# Patient Record
Sex: FEMALE | Race: WHITE | NOT HISPANIC OR LATINO | Employment: PART TIME | URBAN - NONMETROPOLITAN AREA
[De-identification: names, ages, dates, MRNs, and addresses within clinical notes are randomized per-mention and may not be internally consistent; named-entity substitution may affect disease eponyms.]

---

## 2018-03-09 ENCOUNTER — OFFICE VISIT CONVERTED (OUTPATIENT)
Dept: FAMILY MEDICINE CLINIC | Age: 53
End: 2018-03-09
Attending: NURSE PRACTITIONER

## 2018-03-10 LAB
ALBUMIN SERPL-MCNC: 4.5 G/DL
ALBUMIN/GLOB SERPL: 1.9 {RATIO}
ALP SERPL-CCNC: 69 IU/L
ALT SERPL-CCNC: 27 IU/L
AST SERPL-CCNC: 19 IU/L
BASOPHILS # BLD AUTO: 0.1 X10E3/UL
BASOPHILS NFR BLD AUTO: 1 %
BILIRUB SERPL-MCNC: 0.4 MG/DL
BUN SERPL-MCNC: 17 MG/DL
BUN/CREAT SERPL: 18
CALCIUM SERPL-MCNC: 9.8 MG/DL
CHLORIDE SERPL-SCNC: 100 MMOL/L
CHOLEST SERPL-MCNC: 160 MG/DL
CO2 SERPL-SCNC: 27 MMOL/L
CONV IMMATURE GRAN: 0 %
CONV TOTAL PROTEIN: 6.9 G/DL
CREAT UR-MCNC: 0.95 MG/DL
EOSINOPHIL # BLD AUTO: 0.2 X10E3/UL
EOSINOPHIL # BLD AUTO: 4 %
ERYTHROCYTE [DISTWIDTH] IN BLOOD BY AUTOMATED COUNT: 13.6 %
GLOBULIN UR ELPH-MCNC: 2.4 G/DL
GLUCOSE SERPL-MCNC: 103 MG/DL
HCT VFR BLD AUTO: 40.7 %
HCV AB SER DONR QL: <0.1 S/CO RATIO
HDLC SERPL-MCNC: 47 MG/DL
HGB BLD-MCNC: 13.7 G/DL
IMM GRANULOCYTES # BLD: 0 X10E3/UL
LDLC SERPL CALC-MCNC: 95 MG/DL
LDLC/HDLC SERPL: 2 RATIO UNITS
LYMPHOCYTES # BLD AUTO: 1.6 X10E3/UL
LYMPHOCYTES NFR BLD AUTO: 31 %
MCH RBC QN AUTO: 28.9 PG
MCHC RBC AUTO-ENTMCNC: 33.7 G/DL
MCV RBC AUTO: 86 FL
MONOCYTES # BLD AUTO: 0.4 X10E3/UL
MONOCYTES NFR BLD AUTO: 7 %
NEUTROPHILS # BLD AUTO: 2.9 X10E3/UL
NEUTROPHILS NFR BLD AUTO: 57 %
PLATELET # BLD AUTO: 280 X10E3/UL
POTASSIUM SERPL-SCNC: 4.7 MMOL/L
RBC # BLD AUTO: 4.74 X10E6/UL
SODIUM SERPL-SCNC: 142 MMOL/L
TRIGL SERPL-MCNC: 89 MG/DL
TSH SERPL-ACNC: 1.93 UIU/ML
VLDLC SERPL-MCNC: 18 MG/DL
WBC # BLD AUTO: 5.1 X10E3/UL

## 2018-06-13 ENCOUNTER — OFFICE VISIT CONVERTED (OUTPATIENT)
Dept: FAMILY MEDICINE CLINIC | Age: 53
End: 2018-06-13
Attending: NURSE PRACTITIONER

## 2019-03-04 ENCOUNTER — OFFICE VISIT CONVERTED (OUTPATIENT)
Dept: FAMILY MEDICINE CLINIC | Age: 54
End: 2019-03-04
Attending: NURSE PRACTITIONER

## 2019-03-09 LAB
ALBUMIN SERPL-MCNC: 4.6 G/DL
ALBUMIN/GLOB SERPL: 1.8 {RATIO}
ALP SERPL-CCNC: 78 IU/L
ALT SERPL-CCNC: 17 IU/L
AST SERPL-CCNC: 16 IU/L
BASOPHILS # BLD AUTO: 0 X10E3/UL
BASOPHILS NFR BLD AUTO: 1 %
BILIRUB SERPL-MCNC: 0.4 MG/DL
BUN SERPL-MCNC: 13 MG/DL
BUN/CREAT SERPL: 12
CALCIUM SERPL-MCNC: 9.8 MG/DL
CHLORIDE SERPL-SCNC: 101 MMOL/L
CHOLEST SERPL-MCNC: 157 MG/DL
CO2 SERPL-SCNC: 27 MMOL/L
CONV IMMATURE GRAN: 0 %
CONV TOTAL PROTEIN: 7.2 G/DL
CREAT UR-MCNC: 1.08 MG/DL
EOSINOPHIL # BLD AUTO: 0.3 X10E3/UL
EOSINOPHIL # BLD AUTO: 5 %
ERYTHROCYTE [DISTWIDTH] IN BLOOD BY AUTOMATED COUNT: 14.1 %
GLOBULIN UR ELPH-MCNC: 2.6 G/DL
GLUCOSE SERPL-MCNC: 110 MG/DL
HCT VFR BLD AUTO: 41.5 %
HDLC SERPL-MCNC: 47 MG/DL
HGB BLD-MCNC: 13.5 G/DL
IMM GRANULOCYTES # BLD: 0 X10E3/UL
LDLC SERPL CALC-MCNC: 82 MG/DL
LYMPHOCYTES # BLD AUTO: 1.9 X10E3/UL
LYMPHOCYTES NFR BLD AUTO: 32 %
MCH RBC QN AUTO: 28.7 PG
MCHC RBC AUTO-ENTMCNC: 32.5 G/DL
MCV RBC AUTO: 88 FL
MONOCYTES # BLD AUTO: 0.4 X10E3/UL
MONOCYTES NFR BLD AUTO: 6 %
NEUTROPHILS # BLD AUTO: 3.4 X10E3/UL
NEUTROPHILS NFR BLD AUTO: 56 %
PLATELET # BLD AUTO: 276 X10E3/UL
POTASSIUM SERPL-SCNC: 4.4 MMOL/L
RBC # BLD AUTO: 4.71 X10E6/UL
SODIUM SERPL-SCNC: 142 MMOL/L
TRIGL SERPL-MCNC: 139 MG/DL
TSH SERPL-ACNC: 2.97 UIU/ML
VLDLC SERPL-MCNC: 28 MG/DL
WBC # BLD AUTO: 6 X10E3/UL

## 2019-04-05 ENCOUNTER — CONVERSION ENCOUNTER (OUTPATIENT)
Dept: FAMILY MEDICINE CLINIC | Age: 54
End: 2019-04-05

## 2019-04-06 LAB — HBA1C MFR BLD: 6.4 %

## 2019-04-25 ENCOUNTER — OFFICE VISIT CONVERTED (OUTPATIENT)
Dept: FAMILY MEDICINE CLINIC | Age: 54
End: 2019-04-25
Attending: NURSE PRACTITIONER

## 2019-07-25 ENCOUNTER — OFFICE VISIT CONVERTED (OUTPATIENT)
Dept: FAMILY MEDICINE CLINIC | Age: 54
End: 2019-07-25
Attending: NURSE PRACTITIONER

## 2019-07-30 LAB — HBA1C MFR BLD: 6.1 %

## 2019-10-18 ENCOUNTER — OFFICE VISIT CONVERTED (OUTPATIENT)
Dept: FAMILY MEDICINE CLINIC | Age: 54
End: 2019-10-18
Attending: NURSE PRACTITIONER

## 2020-03-05 ENCOUNTER — OFFICE VISIT CONVERTED (OUTPATIENT)
Dept: FAMILY MEDICINE CLINIC | Age: 55
End: 2020-03-05
Attending: NURSE PRACTITIONER

## 2020-03-06 LAB
ALBUMIN SERPL-MCNC: 4.8 G/DL
ALBUMIN/GLOB SERPL: 1.9 {RATIO}
ALP SERPL-CCNC: 68 IU/L
ALT SERPL-CCNC: 20 IU/L
AST SERPL-CCNC: 16 IU/L
BILIRUB SERPL-MCNC: 0.4 MG/DL
BUN SERPL-MCNC: 16 MG/DL
BUN/CREAT SERPL: 19
CALCIUM SERPL-MCNC: 10.1 MG/DL
CHLORIDE SERPL-SCNC: 99 MMOL/L
CHOLEST SERPL-MCNC: 163 MG/DL
CO2 SERPL-SCNC: 26 MMOL/L
CONV TOTAL PROTEIN: 7.3 G/DL
CREAT UR-MCNC: 0.86 MG/DL
GLOBULIN UR ELPH-MCNC: 2.5 G/DL
GLUCOSE SERPL-MCNC: 96 MG/DL
HBA1C MFR BLD: 6 %
HDLC SERPL-MCNC: 58 MG/DL
LDLC SERPL CALC-MCNC: 82 MG/DL
POTASSIUM SERPL-SCNC: 4.7 MMOL/L
SODIUM SERPL-SCNC: 139 MMOL/L
TRIGL SERPL-MCNC: 117 MG/DL
TSH SERPL-ACNC: 2.92 UIU/ML
VLDLC SERPL-MCNC: 23 MG/DL

## 2021-02-19 ENCOUNTER — OFFICE VISIT CONVERTED (OUTPATIENT)
Dept: FAMILY MEDICINE CLINIC | Age: 56
End: 2021-02-19
Attending: NURSE PRACTITIONER

## 2021-02-19 ENCOUNTER — CONVERSION ENCOUNTER (OUTPATIENT)
Dept: FAMILY MEDICINE CLINIC | Age: 56
End: 2021-02-19

## 2021-02-20 LAB
ALBUMIN SERPL-MCNC: 4.5 G/DL
ALBUMIN/GLOB SERPL: 1.7 {RATIO}
ALP SERPL-CCNC: 72 IU/L
ALT SERPL-CCNC: 17 IU/L
AST SERPL-CCNC: 14 IU/L
BILIRUB SERPL-MCNC: 0.4 MG/DL
BUN SERPL-MCNC: 19 MG/DL
BUN/CREAT SERPL: 18
CALCIUM SERPL-MCNC: 9.7 MG/DL
CHLORIDE SERPL-SCNC: 103 MMOL/L
CHOLEST SERPL-MCNC: 151 MG/DL
CO2 SERPL-SCNC: 24 MMOL/L
CONV TOTAL PROTEIN: 7.2 G/DL
CREAT UR-MCNC: 1.03 MG/DL
GLOBULIN UR ELPH-MCNC: 2.7 G/DL
GLUCOSE SERPL-MCNC: 110 MG/DL
HBA1C MFR BLD: 6.9 %
HDLC SERPL-MCNC: 45 MG/DL
LDLC SERPL CALC-MCNC: 84 MG/DL
POTASSIUM SERPL-SCNC: 4.6 MMOL/L
SODIUM SERPL-SCNC: 141 MMOL/L
TRIGL SERPL-MCNC: 120 MG/DL
VLDLC SERPL-MCNC: 22 MG/DL

## 2021-05-18 NOTE — PROGRESS NOTES
Swenson Radha AURA 1965     Office/Outpatient Visit    Visit Date: Thu, Jul 25, 2019 04:13 pm    Provider: Danae To N.P. (Assistant: Tiny Vo)    Location: [facility]        Electronically signed by Danae To N.P. on  07/25/2019 07:27:15 PM                             SUBJECTIVE:        CC:     Jenn is a 53 year old White female.  3 month follow up;         HPI:         Prediabetes: Pt states she has changed her diet in many ways. States not eating as many carbs, eating fruits and vegetables. NO fast food and no sodas. Pt brought her log today, see scanned. Ranging from 100's to 120's. She is now exercising 4-5 days.      ROS:     CONSTITUTIONAL:  Negative for chills, fatigue, fever, and weight change.      EYES:  Negative for blurred vision.      CARDIOVASCULAR:  Negative for chest pain, orthopnea, paroxysmal nocturnal dyspnea and pedal edema.      RESPIRATORY:  Negative for dyspnea.      GASTROINTESTINAL:  Negative for abdominal pain, constipation, diarrhea, nausea and vomiting.      NEUROLOGICAL:  Negative for dizziness, headaches, paresthesias, and weakness.      PSYCHIATRIC:  Negative for anxiety, depression, and sleep disturbances.          PMH/FMH/SH:     Last Reviewed on 7/25/2019 04:53 PM by Danae To    Past Medical History:         Asthma         PREVENTIVE HEALTH MAINTENANCE             COLONOSCOPY: Done within the last 10 years was last done 2010 with normal results     MAMMOGRAM: was last done 2016 with normal results [place]     BONE DENSITY: has never been done     PAP SMEAR: was last done 2016 with normal results [place]     PNEUMOCOCCAL 23 VACCINE: has never been done     Prevnar 13: has never been done     INFLUENZA VACCINE: was last done 2016     EYE EXAM: was last done 2015 normal     EKG: has never been done         Surgical History:         Tonsillectomy/Adenoidectomy      EGD;         Family History:         Positive for Hypertension (  mother ).      Positive for Lung Cancer ( father ).      Positive for Type 2 Diabetes ( mother ).          Social History:     Occupation: [employer]     Marital Status:      Children: 5 children         Tobacco/Alcohol/Supplements:     Last Reviewed on 7/25/2019 04:53 PM by Danae To    Tobacco: She has never smoked.          Alcohol: rare         Substance Abuse History:     Last Reviewed on 7/25/2019 04:53 PM by Danae To    NEGATIVE         Mental Health History:     Last Reviewed on 7/25/2019 04:53 PM by Danae To        Communicable Diseases (eg STDs):     Last Reviewed on 7/25/2019 04:53 PM by Danae To            Current Problems:     Last Reviewed on 7/25/2019 04:53 PM by Danae To    Prediabetes     Overweight     Back pain     Eczema     GERD     Hyperlipidemia     Hypothyroidism-2015     Vitamin D deficiency, NOS     Anemia-history 2015     Constipation     Ulcerative colitis     Family history of diabetes mellitus         Immunizations:     zzFluzone pf-quadrivalent 3 and up 11/4/2016     Fluzone (3 + years dose) 11/2/2009     Fluzone (3 + years dose) 1/10/2013     Fluzone Quadrivalent (3+ years) 10/22/2018     FluMist 10/17/2008     FluMist 11/5/2010     FluMist 10/24/2011     Flumist Quadrivalent 12/20/2013     Fluzone High-Dose pf (>=65 yr) 11/2/2017     Adacel (Tdap) 10/23/2015         Allergies:     Last Reviewed on 7/25/2019 04:53 PM by Danae To    Codeine Sulfate:        Current Medications:     Last Reviewed on 7/25/2019 04:53 PM by Danae To    Lancet   Lancet as directed  QD  Diag   E11.9     Simvastatin 10mg Tablet 1 tab daily     Vitamin D3 1,000IU Capsules take 1 capsule daily     Docusate Sodium 100mg Capsules 1 cap po every day prn     Miralax Powder for Oral Solution 1 capful (17g) daily, mixed in 4-8oz of liquid daily         OBJECTIVE:        Vitals:         Current: 7/25/2019 4:18:42 PM    Ht:  5 ft, 8 in;  Wt:  168.2 lbs;  BMI: 25.6    T: 97 F (oral);  BP: 111/76 mm Hg (left arm, sitting);  P: 77 bpm (left arm (BP Cuff), sitting);  sCr: 1.08 mg/dL;  GFR: 64.76        Exams:     PHYSICAL EXAM:     GENERAL: vital signs recorded - well developed, well nourished;  no apparent distress;     EYES: extraocular movements intact; conjunctiva and cornea are normal; PERRLA;     NECK: range of motion is normal; thyroid is non-palpable;     RESPIRATORY: normal respiratory rate and pattern with no distress; normal breath sounds with no rales, rhonchi, wheezes or rubs;     CARDIOVASCULAR: normal rate; rhythm is regular;  no systolic murmur; no edema;     GASTROINTESTINAL: nontender; normal bowel sounds; no organomegaly;     NEUROLOGICAL:  cranial nerves, motor and sensory function, reflexes, gait and coordination are all intact;     PSYCHIATRIC:  appropriate affect and demeanor; normal speech pattern; grossly normal memory;         ASSESSMENT:           790.29   R73.03  Prediabetes              DDx:         ORDERS:         Meds Prescribed:       Refill of: Lancet  Lancet as directed  BID  Diag   E11.40  #60 (Sixty) each Refills: 5       Glucose Reagent Blood Test Strips (Glucose Reagent Blood Test Strips) Reagent Strips Check blood sugar 1-2 times per day E11.9  #100 (One [place]) strip(s) Refills: 3         Radiology/Test Orders:       3017F  Colorectal CA screen results documented and reviewed (PV)  (In-House)           Lab Orders:       [id number]  A1C - [facility] Hemoglobin A1C  (Send-Out)           Other Orders:         Depression screen negative  (In-House)           Negative EtOH screen  (In-House)                   PLAN:          Prediabetes     LABORATORY:  Labs ordered to be performed today include HgbA1C.  MIPS Negative Depression Screen Negative alcohol screen           Prescriptions:       Refill of: Lancet  Lancet as directed  BID  Diag   E11.40  #60 (Sixty) each Refills: 5       Glucose Reagent Blood Test Strips (Glucose  Reagent Blood Test Strips) Reagent Strips Check blood sugar 1-2 times per day E11.9  #100 (One [place]) strip(s) Refills: 3           Orders:       [id number]  A1C - [facility] Hemoglobin A1C  (Send-Out)           Depression screen negative  (In-House)           Negative EtOH screen  (In-House)         3017F  Colorectal CA screen results documented and reviewed (PV)  (In-House)               CHARGE CAPTURE:           Primary Diagnosis:     790.29 Prediabetes            R73.03    Prediabetes              Orders:          [id number]   Office/outpatient visit; established patient, level 3  (In-House)                Depression screen negative  (In-House)                Negative EtOH screen  (In-House)             3017F   Colorectal CA screen results documented and reviewed (PV)  (In-House)

## 2021-05-18 NOTE — PROGRESS NOTES
Swenson Radha AURA 1965     Office/Outpatient Visit    Visit Date: Fri, Oct 18, 2019 08:58 am    Provider: Danae To N.P. (Assistant: Rox Fox MA)    Location: [facility]        Electronically signed by Danae To N.P. on  10/20/2019 07:43:44 PM                             SUBJECTIVE:        CC:     Jenn is a 54 year old White female.  Patient presents today for three month follow up, medication refills;         HPI:         Prediabetes: Pt states not seeing much difference in her blood sugar levels. Last A1C was 6.1 in July.          Concerning asthma, NOS, pt states being diagnosed years ago. She was put on advair then but hasn's used it in several months. Now with returning symptoms of feeling sob. She is requesting a refill.      ROS:     CONSTITUTIONAL:  Negative for chills, fatigue, fever, and weight change.      EYES:  Negative for blurred vision.      CARDIOVASCULAR:  Negative for chest pain, orthopnea, paroxysmal nocturnal dyspnea and pedal edema.      RESPIRATORY:  Negative for dyspnea.      GASTROINTESTINAL:  Negative for abdominal pain, constipation, diarrhea, nausea and vomiting.      NEUROLOGICAL:  Negative for dizziness, headaches, paresthesias, and weakness.      PSYCHIATRIC:  Negative for anxiety, depression, and sleep disturbances.          PM/FM/SH:     Last Reviewed on 10/18/2019 09:30 AM by Danae To    Past Medical History:         Asthma         PREVENTIVE HEALTH MAINTENANCE             COLONOSCOPY: Done within the last 10 years was last done 2010 with normal results     MAMMOGRAM: was last done 2016 with normal results [place]     BONE DENSITY: has never been done     PAP SMEAR: was last done 2016 with normal results [place]     PNEUMOCOCCAL 23 VACCINE: has never been done     Prevnar 13: has never been done     INFLUENZA VACCINE: was last done 2016     EYE EXAM: was last done 2015 normal     EKG: has never been done         Surgical  History:         Tonsillectomy/Adenoidectomy      EGD;         Family History:         Positive for Hypertension ( mother ).      Positive for Lung Cancer ( father ).      Positive for Type 2 Diabetes ( mother ).          Social History:     Occupation: [employer]     Marital Status:      Children: 5 children         Tobacco/Alcohol/Supplements:     Last Reviewed on 10/18/2019 09:30 AM by Danae To    Tobacco: She has never smoked.          Alcohol: rare         Substance Abuse History:     Last Reviewed on 10/18/2019 09:30 AM by Danae To    NEGATIVE         Mental Health History:     Last Reviewed on 10/18/2019 09:30 AM by Danae To        Communicable Diseases (eg STDs):     Last Reviewed on 10/18/2019 09:30 AM by Danae To            Current Problems:     Last Reviewed on 10/18/2019 09:30 AM by Danae To    Prediabetes     Overweight     Back pain     Eczema     GERD     Hyperlipidemia     Hypothyroidism-2015     Vitamin D deficiency, NOS     Anemia-history 2015     Constipation     Ulcerative colitis     Family history of diabetes mellitus         Immunizations:     zzFluzone pf-quadrivalent 3 and up 11/4/2016     Fluzone (3 + years dose) 11/2/2009     Fluzone (3 + years dose) 1/10/2013     Fluzone Quadrivalent (3+ years) 10/22/2018     Fluzone Quadrivalent (3+ years) 10/1/2019     FluMist 10/17/2008     FluMist 11/5/2010     FluMist 10/24/2011     Flumist Quadrivalent 12/20/2013     Fluzone High-Dose pf (>=65 yr) 11/2/2017     Adacel (Tdap) 10/23/2015         Allergies:     Last Reviewed on 10/18/2019 09:30 AM by Danae To    Codeine Sulfate:        Current Medications:     Last Reviewed on 10/18/2019 09:30 AM by Danae To    Lancet   Lancet as directed  BID  Diag   E11.40     Simvastatin 10mg Tablet 1 tab daily     Metformin HCl 500mg Tablet 1 tab daily     Glucose Reagent Blood Test Strips  Reagent Strips Check blood sugar 1-2 times per  day E11.9     Vitamin D3 1,000IU Capsules take 1 capsule daily     Docusate Sodium 100mg Capsules 1 cap po every day prn     Miralax Powder for Oral Solution 1 capful (17g) daily, mixed in 4-8oz of liquid daily         OBJECTIVE:        Vitals:         Current: 10/18/2019 9:02:07 AM    Ht:  5 ft, 8 in;  Wt: 165.2 lbs;  BMI: 25.1    T: 97.8 F (oral);  BP: 119/75 mm Hg (left arm, sitting);  P: 91 bpm (left arm (BP Cuff), sitting);  sCr: 1.08 mg/dL;  GFR: 63.54        Exams:     PHYSICAL EXAM:     GENERAL: vital signs recorded - well developed, well nourished;  no apparent distress;     EYES: extraocular movements intact; conjunctiva and cornea are normal; PERRLA;     NECK: range of motion is normal; thyroid is non-palpable;     RESPIRATORY: normal respiratory rate and pattern with no distress; normal breath sounds with no rales, rhonchi, wheezes or rubs;     CARDIOVASCULAR: normal rate; rhythm is regular;  no systolic murmur; no edema;     GASTROINTESTINAL: nontender; normal bowel sounds; no organomegaly;     NEUROLOGICAL:  cranial nerves, motor and sensory function, reflexes, gait and coordination are all intact;     PSYCHIATRIC:  appropriate affect and demeanor; normal speech pattern; grossly normal memory;         ASSESSMENT:           790.29   R73.03  Prediabetes              DDx:     493.90   J45.909  Asthma, NOS              DDx:         ORDERS:         Meds Prescribed:       Refill of: Advair Diskus (Fluticasone/Salmeterol) 250mcg/50mcg per 1blister Inhalation Powder 1 puff bid  #1 (One) package Refills: 3       Refill of: Metformin HCl 500mg Tablet 1 tab daily  #90 (Ninety) tablet(s) Refills: 1                 PLAN: Pt will call to schedule her 6 month follow up.          Prediabetes           Prescriptions:       Refill of: Metformin HCl 500mg Tablet 1 tab daily  #90 (Ninety) tablet(s) Refills: 1          Asthma, NOS           Prescriptions:       Refill of: Advair Diskus (Fluticasone/Salmeterol) 250mcg/50mcg  per 1blister Inhalation Powder 1 puff bid  #1 (One) package Refills: 3             CHARGE CAPTURE:           Primary Diagnosis:     790.29 Prediabetes            R73.03    Prediabetes              Orders:          [id number]   Office/outpatient visit; established patient, level 3  (In-House)           493.90 Asthma, NOS            J45.909    Unspecified asthma, uncomplicated

## 2021-05-18 NOTE — PROGRESS NOTES
Swenson Radha AURA 1965     Office/Outpatient Visit    Visit Date: Thu, Apr 25, 2019 04:10 pm    Provider: Danae To N.P. (Assistant: Boo Aponte)    Location: [facility]        Electronically signed by Danae To N.P. on  04/26/2019 05:20:36 PM                             SUBJECTIVE:        CC:     Jenn is a 53 year old White female.  This is a follow-up visit.          HPI:         Prediabetes: Pt states having gestational diabetes. States multiple family members are diabetic.  She is familiar with checking her blood sugar. States her diet is very bad. She likes sweets and eats too much.         Thumb pain: R thumb with pain x 3 weeks. She feels she has a trigger thumb. Has been using a brace with some relief.      ROS:     CONSTITUTIONAL:  Negative for chills, fatigue, fever, and weight change.      EYES:  Negative for blurred vision.      CARDIOVASCULAR:  Negative for chest pain, orthopnea, paroxysmal nocturnal dyspnea and pedal edema.      RESPIRATORY:  Negative for dyspnea.      GASTROINTESTINAL:  Negative for abdominal pain, constipation, diarrhea, nausea and vomiting.      NEUROLOGICAL:  Negative for dizziness, headaches, paresthesias, and weakness.      PSYCHIATRIC:  Negative for anxiety, depression, and sleep disturbances.          [facility]/FMH/SH:     Last Reviewed on 3/04/2019 10:08 AM by Danae To    Past Medical History:         Asthma         PREVENTIVE HEALTH MAINTENANCE             COLONOSCOPY: Done within the last 10 years was last done 2010 with normal results     MAMMOGRAM: was last done 2016 with normal results [place]     BONE DENSITY: has never been done     PAP SMEAR: was last done 2016 with normal results [place]     PNEUMOCOCCAL 23 VACCINE: has never been done     Prevnar 13: has never been done     INFLUENZA VACCINE: was last done 2016     EYE EXAM: was last done 2015 normal     EKG: has never been done         Surgical History:          Tonsillectomy/Adenoidectomy      EGD;         Family History:         Positive for Hypertension ( mother ).      Positive for Lung Cancer ( father ).      Positive for Type 2 Diabetes ( mother ).          Social History:     Occupation: [employer]     Marital Status:      Children: 5 children         Tobacco/Alcohol/Supplements:     Last Reviewed on 3/04/2019 10:08 AM by Danae To    Tobacco: She has never smoked.          Alcohol: rare         Substance Abuse History:     Last Reviewed on 3/04/2019 10:08 AM by Danae To    NEGATIVE         Mental Health History:     Last Reviewed on 3/04/2019 10:08 AM by Danae To        Communicable Diseases (eg STDs):     Last Reviewed on 3/04/2019 10:08 AM by Danae To            Current Problems:     Last Reviewed on 3/04/2019 10:08 AM by Danae To    Overweight     Back pain     Eczema     GERD     Hyperlipidemia     Hypothyroidism-2015     Vitamin D deficiency, NOS     Anemia-history 2015     Constipation     Ulcerative colitis     Family history of diabetes mellitus     Hyperglycemia         Immunizations:     zzFluzone pf-quadrivalent 3 and up 11/4/2016     Fluzone (3 + years dose) 11/2/2009     Fluzone (3 + years dose) 1/10/2013     Fluzone Quadrivalent (3+ years) 10/22/2018     FluMist 10/17/2008     FluMist 11/5/2010     FluMist 10/24/2011     Flumist Quadrivalent 12/20/2013     Fluzone High-Dose pf (>=65 yr) 11/2/2017     Adacel (Tdap) 10/23/2015         Allergies:     Last Reviewed on 3/04/2019 10:08 AM by Danae To    Codeine Sulfate:        Current Medications:     Last Reviewed on 3/04/2019 10:08 AM by Danae To    Simvastatin 10mg Tablet 1 tab daily     Protonix 20mg Tablets, Delayed Release Take 1 tablet(s) by mouth daily     Vitamin D3 1,000IU Capsules take 1 capsule daily     Docusate Sodium 100mg Capsules 1 cap po every day prn     Miralax Powder for Oral Solution 1 capful (17g) daily, mixed  in 4-8oz of liquid daily         OBJECTIVE:        Vitals:         Current: 4/25/2019 4:15:28 PM    Ht:  5 ft, 8 in;  Wt: 173.4 lbs;  BMI: 26.4    T: 98.3 F (oral);  BP: 125/71 mm Hg (left arm, sitting);  P: 73 bpm (left arm (BP Cuff), sitting);  sCr: 1.08 mg/dL;  GFR: 65.60        Exams:     PHYSICAL EXAM:     GENERAL: vital signs recorded - well developed, well nourished;  no apparent distress;     EYES: extraocular movements intact; conjunctiva and cornea are normal; PERRLA;     NECK: range of motion is normal; thyroid is non-palpable;     RESPIRATORY: normal respiratory rate and pattern with no distress; normal breath sounds with no rales, rhonchi, wheezes or rubs;     CARDIOVASCULAR: normal rate; rhythm is regular;  no systolic murmur; no edema;     GASTROINTESTINAL: nontender; normal bowel sounds; no organomegaly;     MUSCULOSKELETAL: R thumb tenderness.;     NEUROLOGICAL:  cranial nerves, motor and sensory function, reflexes, gait and coordination are all intact;     PSYCHIATRIC:  appropriate affect and demeanor; normal speech pattern; grossly normal memory;         ASSESSMENT:           790.29   R73.03  Prediabetes              DDx:     729.5   M79.641  Thumb pain              DDx:         ORDERS:         Meds Prescribed:       Glucose Monitoring Care Kit Kit use as directed, ac tid and hs  #1 (One) kit(s) Refills: 0       Glucose Reagent Blood Test Strips (Glucose Reagent Blood Test Strips) Reagent Strips Check blood sugar 1-2 times per day E11.9  #100 (One [place]) strip(s) Refills: 0         Lab Orders:       APPTO  Appointment need  (In-House)                   PLAN:          Prediabetes         FOLLOW-UP: Schedule a follow-up visit in 3 months..            Prescriptions: Pt will wait to recheck A1C in 3 months and will decide on medication.       Glucose Monitoring Care Kit Kit use as directed, ac tid and hs  #1 (One) kit(s) Refills: 0       Glucose Reagent Blood Test Strips (Glucose Reagent Blood Test  Strips) Reagent Strips Check blood sugar 1-2 times per day E11.9  #100 (One [place]) strip(s) Refills: 0           Orders:       APPTO  Appointment need  (In-House)            Thumb pain Rx alternatives med sent.             Patient Recommendations:        For  Prediabetes:     Schedule a follow-up visit in 3 months.                APPOINTMENT INFORMATION:        Monday Tuesday Wednesday Thursday Friday Saturday Sunday            Time:___________________AM  PM   Date:_____________________             CHARGE CAPTURE:           Primary Diagnosis:     790.29 Prediabetes            R73.03    Prediabetes              Orders:          [id number]   Office/outpatient visit; established patient, level 3  (In-House)             APPTO   Appointment need  (In-House)           729.5 Thumb pain            M79.641    Pain in right hand

## 2021-05-18 NOTE — PROGRESS NOTES
"Radha Swenson  1965     Office/Outpatient Visit    Visit Date: Fri, Feb 19, 2021 10:12 am    Provider: Vania Lamb N.P. (Assistant: Brii Moore MA)    Location: [facility]        Electronically signed by Vania Lamb N.P. on  02/21/2021 03:53:15 PM                             Subjective:        CC: Jenn is a 55 year old White female.  \"spots under my arm, been there for about a month\"; pt wants to make appt         HPI:           Patient complains of rash and other nonspecific skin eruption.  This has been a problem for the past week.  The rash has not occurred previously.  It is located primarily bilateral axilla.  She describes the rash as red, papular, and in skin folds left axilla and slight hyperpigmentation.  The rash has been essentially asymptomatic.  She denies any associated symptoms.  Possible contributing factors include new deodorant.  stays  home as much as possible due to grandchild with diminshed health post premature birth in 2020.      ROS:     CONSTITUTIONAL:  Negative for chills, fatigue, fever, and weight change.      CARDIOVASCULAR:  Negative for chest pain, palpitations, tachycardia, orthopnea, and edema.      RESPIRATORY:  Negative for cough, dyspnea, and hemoptysis.      MUSCULOSKELETAL:  Negative for arthralgias, back pain, and myalgias.      INTEGUMENTARY/BREAST:  Positive for rash.      NEUROLOGICAL:  Negative for dizziness, headaches, paresthesias, and weakness.      ENDOCRINE:  Negative for hair loss, heat/cold intolerance, polydipsia, and polyphagia.          Past Medical History / Family History / Social History:         Last Reviewed on 2/19/2021 10:40 AM by Vania Lamb    Past Medical History:                 PAST MEDICAL HISTORY         Asthma     Crohn's Disease: dx'd at age age 19s;     Gastroesophageal Reflux Disease         CURRENT MEDICAL PROVIDERS:    Obstetrician/Gynecologist         PREVENTIVE HEALTH MAINTENANCE             BONE " DENSITY: was last done dec 2019 with the following abnormality noted-- mild osteopenia     COLORECTAL CANCER SCREENING: Up to date (colonoscopy q10y; sigmoidoscopy q5y; Cologuard q3y) was last done april 2011, Results are in chart; colonoscopy with normal results     EYE EXAM: was last done 2015 normal     MAMMOGRAM: was last done dec 2019 with normal results [place]     PAP SMEAR: was last done dec 2019 with normal results [place]         Surgical History:         Tonsillectomy/Adenoidectomy  Uterine Ablation    EGD;         Family History:         Positive for Hypertension ( mother ).      Positive for Lung Cancer ( father ).      Positive for Type 2 Diabetes ( mother ).          Social History:     Occupation: [employer]     Marital Status:      Children: 5 children         Tobacco/Alcohol/Supplements:     Last Reviewed on 2/19/2021 10:15 AM by Brii Moore    Tobacco: She has never smoked.          Alcohol: rare         Substance Abuse History:     Last Reviewed on 10/18/2019 09:30 AM by Danae To    NEGATIVE         Mental Health History:     Last Reviewed on 10/18/2019 09:30 AM by Danae To        Communicable Diseases (eg STDs):     Last Reviewed on 10/18/2019 09:30 AM by Danae To        Current Problems:     Last Reviewed on 2/19/2021 11:00 AM by Vania Lamb    Vitamin D deficiency, unspecified    Gastro-esophageal reflux disease with esophagitis    Atopic dermatitis, unspecified    Overweight    Prediabetes    Unspecified asthma, uncomplicated    Mixed hyperlipidemia    Rash and other nonspecific skin eruption    Encounter for screening for other suspected endocrine disorder    Encounter for screening for depression        Immunizations:     influenza, injectable, quadrivalent, preservative free 10/30/2020    zzFluzone pf-quadrivalent 3 and up 11/4/2016    Fluzone (3 + years dose) 11/2/2009    Fluzone (3 + years dose) 1/10/2013    Fluzone Quadrivalent (3+  years) 10/22/2018    Fluzone Quadrivalent (3+ years) 10/1/2019    FluMist 10/17/2008    FluMist 11/5/2010    FluMist 10/24/2011    Flumist Quadrivalent 12/20/2013    Fluzone High-Dose pf (>=65 yr) 11/2/2017    Adacel (Tdap) 10/23/2015        Allergies:     Last Reviewed on 2/19/2021 10:15 AM by Brii Moore    Codeine Sulfate:          Current Medications:     Last Reviewed on 2/19/2021 10:15 AM by Brii Moore    Advair Diskus 250-50 mcg/dose Inhalation Blister, With Inhalation Device [1 puff bid]    Miralax 17 gram/dose oral Powder [1 capful (17g) daily, mixed in 4-8oz of liquid daily]    Simvastatin 10 mg oral tablet [1 tab daily]    Docusate Sodium 100 mg oral capsule [1 cap po every day prn]    Vitamin D3 25 mcg (1,000 unit) oral capsule [take 1 capsule daily]    Blood Glucose Test strips [Check blood sugar 1-2 times per day E11.9]    Lancet   Lancet [as directed  BID  Diag   E11.40]    metFORMIN 500 mg oral Tablet, Extended Release 24 hr [take 1 tablet (500 mg) by oral route once daily with a meal]        Objective:        Vitals:         Historical:     3/5/2020  BP:   118/82 mm Hg ( (right arm, , sitting, );) 3/5/2020  Wt:   805gbv35/18/2019  Wt:   165.2lbs    Current: 2/19/2021 10:17:20 AM    Ht:  5 ft, 8 in;  Wt: 180 lbs;  BMI: 27.4T: 96.3 F (temporal);  BP: 118/79 mm Hg (right arm, sitting);  P: 90 bpm (right arm (BP Cuff), sitting);  sCr: 0.86 mg/dL;  GFR: 81.81        Exams:     PHYSICAL EXAM:     GENERAL:  well developed and nourished; appropriately groomed; in no apparent distress;     NECK: thyroid is non-palpable;     RESPIRATORY: normal respiratory rate and pattern with no distress; normal breath sounds with no rales, rhonchi, wheezes or rubs;     CARDIOVASCULAR: normal rate; rhythm is regular;     BREAST/INTEGUMENT: left axilla with mild hyperpigmentation of skin folds.  right axilla with slight maculopapular red raised areas.      MUSCULOSKELETAL:  Normal range of motion, strength and tone;      "NEUROLOGIC: mental status: alert and oriented x 3; GROSSLY INTACT     PSYCHIATRIC:  appropriate affect and demeanor; normal speech pattern; grossly normal memory;         Assessment:         R21   Rash and other nonspecific skin eruption           ORDERS:         Meds Prescribed:       [New Rx] nystatin-triamcinolone 100,000-0.1 unit/g-% Topical Cream [apply to the affected area(s) by topical route 2 times per day in themorning and evening], #30 (thirty) grams, Refills: 0 (zero)                 Plan: mammogram may 2021        Rash and other nonspecific skin eruption        RECOMMENDATIONS given include: avoid irritants (such as wool clothing, synthetics, etc.) and keep area dry.  no lymphadenopathy or boil like areas or signs of infection.  had labs done this am for follow up prediabetes.  results pending.  follow up if not improving..            Prescriptions:       [New Rx] nystatin-triamcinolone 100,000-0.1 unit/g-% Topical Cream [apply to the affected area(s) by topical route 2 times per day in themorning and evening], #30 (thirty) grams, Refills: 0 (zero)             Patient Recommendations:        For  Rash and other nonspecific skin eruption:        Avoid irritants such as wool clothing or synthetics; some \"no iron\" garments contain formaldehyde residue which may aggravate skin irritation.              Charge Capture:         Primary Diagnosis:     R21  Rash and other nonspecific skin eruption           Orders:      60211  Office/outpatient visit; established patient, level 3  (In-House)                     "

## 2021-05-18 NOTE — PROGRESS NOTES
Radha SwensonRadha 1965     Office/Outpatient Visit    Visit Date: Wed, Jun 13, 2018 11:45 am    Provider: Liza Dale N.P. (Assistant: Louise Burleson MA)    Location: [facility]        Electronically signed by Liza Dale N.P. on  06/13/2018 09:17:25 PM                             SUBJECTIVE:        CC:     Jenn is a 52 year old White female.  cut Left wrist 3 weeks ago, now red & has bumps;         HPI: Jenn reports that 3 weeks ago she cut herself on [place] wrap. Now she has redness and bumps on her left forearm. She has put hydrogen peroxide on it as well has polysporin ointment. Up to date on tetanus vaccination.             ROS:     CONSTITUTIONAL:  Negative for chills and fever.      CARDIOVASCULAR:  Negative for chest pain and palpitations.      RESPIRATORY:  Negative for dyspnea and frequent wheezing.      INTEGUMENTARY/BREAST:  Positive for rash.          [facility]:     Last Reviewed on 3/09/2018 09:02 AM by Danae To    Past Medical History:         Asthma         PREVENTIVE HEALTH MAINTENANCE             COLONOSCOPY: Done within the last 10 years was last done 2010 with normal results     MAMMOGRAM: was last done 2016 with normal results [place]     BONE DENSITY: has never been done     PAP SMEAR: was last done 2016 with normal results [place]     PNEUMOCOCCAL 23 VACCINE: has never been done     Prevnar 13: has never been done     INFLUENZA VACCINE: was last done 2016     EYE EXAM: was last done 2015 normal     EKG: has never been done         Surgical History:         Tonsillectomy/Adenoidectomy      EGD;         Family History:         Positive for Hypertension ( mother ).      Positive for Lung Cancer ( father ).      Positive for Type 2 Diabetes ( mother ).          Social History:     Occupation: [employer]     Marital Status:      Children: 5 children         Tobacco/Alcohol/Supplements:     Last Reviewed on 3/09/2018 09:02 AM by Yousuf  Danae    Tobacco: She has never smoked.          Alcohol: rare         Substance Abuse History:     Last Reviewed on 3/09/2018 09:02 AM by Danae To    NEGATIVE         Mental Health History:     Last Reviewed on 3/09/2018 09:02 AM by Danae To        Communicable Diseases (eg STDs):     Last Reviewed on 3/09/2018 09:02 AM by Danae To            Current Problems:     Last Reviewed on 3/09/2018 09:02 AM by Danae To    Back pain     Eczema     GERD     Hyperlipidemia     Hypothyroidism-2015     Vitamin D deficiency, NOS     Anemia-history 2015     Constipation     Ulcerative colitis     Family history of diabetes mellitus         Immunizations:     zzFluzone pf-quadrivalent 3 and up 11/4/2016     Fluzone (3 + years dose) 11/2/2009     Fluzone (3 + years dose) 1/10/2013     FluMist 10/17/2008     FluMist 11/5/2010     FluMist 10/24/2011     Flumist Quadrivalent 12/20/2013     Fluzone High-Dose pf (>=65 yr) 11/2/2017     Adacel (Tdap) 10/23/2015         Allergies:     Last Reviewed on 3/09/2018 09:02 AM by Danae To    Codeine Sulfate:        Current Medications:     Last Reviewed on 3/09/2018 09:02 AM by Danae To    Simvastatin 10mg Tablet 1 tab daily     Protonix 20mg Tablets, Delayed Release Take 1 tablet(s) by mouth daily     Vitamin D3 1,000IU Capsules take 1 capsule daily     Docusate Sodium 100mg Capsules 1 cap po every day prn     Miralax Powder for Oral Solution 1 capful (17g) daily, mixed in 4-8oz of liquid daily         OBJECTIVE:        Vitals:         Current: 6/13/2018 11:47:31 AM    Ht:  5 ft, 8 in;  Wt: 169.6 lbs;  BMI: 25.8    T: 97.4 F (oral);  BP: 116/77 mm Hg (left arm, sitting);  P: 71 bpm (left arm (BP Cuff), sitting);  sCr: 0.95 mg/dL;  GFR: 74.72        Exams:     PHYSICAL EXAM:     GENERAL: well developed, well nourished;  no apparent distress;     RESPIRATORY: normal respiratory rate and pattern with no distress; normal breath sounds with no  rales, rhonchi, wheezes or rubs;     CARDIOVASCULAR: normal rate; rhythm is regular;     BREAST/INTEGUMENT: SKIN: no significant rashes or lesions; no suspicious moles;   left forearm with area of erythema and appearance of infection, approximately 50 cent piece sized.;     MUSCULOSKELETAL: normal gait; normal range of motion of all major muscle groups; no limb or joint pain with range of motion;     NEUROLOGIC: mental status: alert and oriented x 3; GROSSLY INTACT     PSYCHIATRIC: appropriate affect and demeanor;         ASSESSMENT           686.9   L08.89  Skin infection              DDx:         ORDERS:         Meds Prescribed:       Mupirocin 2% Topical Ointment Apply to affected area tid x 10 days  #30 (Thirty) gm Refills: 0                 PLAN:          Skin infection Will call in prescription for Keflex if no improvement in symptoms. Advised patient that she may call if no improvement with Mupirocin.         RECOMMENDATIONS given include: Keep clean and dry..      FOLLOW-UP: Advised to call if there is no improvement 3 days.   Schedule follow-up appointments on a p.r.n. basis. Chronic visit follow up           Prescriptions:       Mupirocin 2% Topical Ointment Apply to affected area tid x 10 days  #30 (Thirty) gm Refills: 0             Patient Recommendations:        For  Skin infection:     Follow-up by phone if no improvement in 3 days. Schedule follow-up appointments as needed.              CHARGE CAPTURE           **Please note: ICD descriptions below are intended for billing purposes only and may not represent clinical diagnoses**        Primary Diagnosis:         686.9 Skin infection            L08.89    Other specified local infections of the skin and subcutaneous tissue              Orders:          24024   Office/outpatient visit; established patient, level 3  (In-House)

## 2021-05-18 NOTE — PROGRESS NOTES
Radha Swenson  1965     Office/Outpatient Visit    Visit Date: Thu, Mar 5, 2020 09:11 am    Provider: Vania Lamb N.P. (Assistant: Spurling, Sarah C, MA)    Location: [facility]        Electronically signed by Vania Lamb N.P. on  03/05/2020 06:58:40 PM                             Subjective:        CC: Jenn is a 54 year old White female.  This is a follow-up visit.  Needing refills. & est care with Vania Lamb.;         HPI:           PHQ-9 Depression Screening: Completed form scanned and in chart; Total Score 8           started metformin summer 2019 for hgb a1c 6.1.  had been 6.4 prior to that in 2019.  tolerating metformin well with no concerns.  diet not as good due to grandchild in hospital NICU and staying out of town frequently due to that.  does not check glucose but has gluocometer at home for prn use.            Unspecified asthma, uncomplicated details; uses advair a couple of times per year.  denies side effects.  requests refills.            Dx with mixed hyperlipidemia; current treatment includes Zocor.  Compliance with treatment has been good; she takes her medication as directed.  She reports intermittent leg cramps at night.      ROS:     CONSTITUTIONAL:  Negative for chills, fatigue, fever, and weight change.      E/N/T:  Negative for hearing problems, E/N/T pain, congestion, rhinorrhea, epistaxis, hoarseness, and dental problems.      CARDIOVASCULAR:  Negative for chest pain, palpitations, tachycardia, orthopnea, and edema.      RESPIRATORY:  Negative for cough, dyspnea, and hemoptysis.      GASTROINTESTINAL:  Positive for acid reflux symptoms ( doing well off protonix x 5-6 months.  symptoms controlled on prn tums currently  2011 EGD with no concerns. ) and constipation ( intermittent but stable ).   Negative for diarrhea, nausea or vomiting.      MUSCULOSKELETAL:  Positive for limb pain ( intermittent leg cramps at night ).   Negative for arthralgias, back  pain or joint stiffness.      NEUROLOGICAL:  Negative for dizziness, headaches, paresthesias, and weakness.      ENDOCRINE:  Positive for hair loss.   Negative for temperature intolerances.      PSYCHIATRIC:  Positive for feelings of stress.   Negative for anxiety, depression, sleep disturbance or suicidal thoughts.          Past Medical History / Family History / Social History:         Last Reviewed on 3/05/2020 09:56 AM by Vania Lamb    Past Medical History:                 PAST MEDICAL HISTORY         Asthma     Crohn's Disease: dx'd at age age 19s;     Gastroesophageal Reflux Disease         PREVENTIVE HEALTH MAINTENANCE             BONE DENSITY: was last done dec 2019 with the following abnormality noted-- mild osteopenia     COLORECTAL CANCER SCREENING: Up to date (colonoscopy q10y; sigmoidoscopy q5y; Cologuard q3y) was last done april 2011, Results are in chart; colonoscopy with normal results     EYE EXAM: was last done 2015 normal     MAMMOGRAM: was last done dec 2019 with normal results [place]     PAP SMEAR: was last done dec 2019 with normal results [facility]         Surgical History:         Tonsillectomy/Adenoidectomy     EGD;         Family History:         Positive for Hypertension ( mother ).      Positive for Lung Cancer ( father ).      Positive for Type 2 Diabetes ( mother ).          Social History:     Occupation: [employer]     Marital Status:      Children: 5 children         Tobacco/Alcohol/Supplements:     Last Reviewed on 3/05/2020 09:11 AM by Spurling, Sarah C    Tobacco: She has never smoked.          Alcohol: rare         Substance Abuse History:     Last Reviewed on 10/18/2019 09:30 AM by Danae To    NEGATIVE         Mental Health History:     Last Reviewed on 10/18/2019 09:30 AM by Danae To        Communicable Diseases (eg STDs):     Last Reviewed on 10/18/2019 09:30 AM by Danae To        Current Problems:     Last Reviewed on  3/05/2020 09:29 AM by Vania Lamb    Vitamin D deficiency, unspecified    Gastro-esophageal reflux disease with esophagitis    Atopic dermatitis, unspecified    Overweight    Prediabetes    Unspecified asthma, uncomplicated    Encounter for screening for other suspected endocrine disorder    Encounter for screening for depression    Mixed hyperlipidemia        Immunizations:     zzFluzone pf-quadrivalent 3 and up 11/4/2016    Fluzone (3 + years dose) 11/2/2009    Fluzone (3 + years dose) 1/10/2013    Fluzone Quadrivalent (3+ years) 10/22/2018    Fluzone Quadrivalent (3+ years) 10/1/2019    FluMist 10/17/2008    FluMist 11/5/2010    FluMist 10/24/2011    Flumist Quadrivalent 12/20/2013    Fluzone High-Dose pf (>=65 yr) 11/2/2017    Adacel (Tdap) 10/23/2015        Allergies:     Last Reviewed on 3/05/2020 09:11 AM by Spurling, Sarah C    Codeine Sulfate:          Current Medications:     Last Reviewed on 3/05/2020 09:17 AM by Spurling, Sarah C    Advair Diskus 250mcg/50mcg per 1blister Inhalation Powder [1 puff bid]    Miralax 17 gram/dose oral Powder [1 capful (17g) daily, mixed in 4-8oz of liquid daily]    Simvastatin 10mg Tablet [1 tab daily]    Docusate Sodium 100 mg oral capsule [1 cap po every day prn]    Vitamin D3 25 mcg (1,000 unit) oral capsule [take 1 capsule daily]    Lancet   Lancet [as directed  BID  Diag   E11.40]    metFORMIN 500 mg oral Tablet, Extended Release 24 hr [take 1 tablet (500 mg) by oral route once daily with the evening meal]        Objective:        Vitals:         Historical:     10/18/2019  BP:   119/75 mm Hg ( (left arm, , sitting, );) 10/18/2019  Wt:   165.2lbs    Current: 3/5/2020 9:22:23 AM    Ht:  5 ft, 8 in;  Wt: 161 lbs;  BMI: 24.5T: 97.4 F (oral);  BP: 118/82 mm Hg (right arm, sitting);  P: 81 bpm (right arm (BP Cuff), sitting);  sCr: 1.08 mg/dL;  GFR: 62.85        Exams:     PHYSICAL EXAM:     GENERAL:  well developed and nourished; appropriately groomed; in no apparent  distress;     NECK: thyroid is non-palpable;     RESPIRATORY: normal respiratory rate and pattern with no distress; normal breath sounds with no rales, rhonchi, wheezes or rubs;     CARDIOVASCULAR: normal rate; rhythm is regular;     Peripheral Pulses: posterior tibial: 2+ amplitude, no bruits; no edema;     MUSCULOSKELETAL:  Normal range of motion, strength and tone;     NEUROLOGIC: mental status: alert and oriented x 3; GROSSLY INTACT     PSYCHIATRIC:  appropriate affect and demeanor; normal speech pattern; grossly normal memory;         Assessment:         Z13.31   Encounter for screening for depression       R73.03   Prediabetes       J45.909   Unspecified asthma, uncomplicated       E78.2   Mixed hyperlipidemia       Z13.29   Encounter for screening for other suspected endocrine disorder           ORDERS:         Meds Prescribed:       [Refilled] metFORMIN 500 mg oral Tablet, Extended Release 24 hr [take 1 tablet (500 mg) by oral route once daily with a meal], #90 (ninety) tablets, Refills: 3 (three)       [Refilled] Advair Diskus 250-50 mcg/dose Inhalation Blister, With Inhalation Device [1 puff bid], #1 (one) each, Refills: 3 (three)       [Refilled] Simvastatin 10 mg oral tablet [1 tab daily], #90 (ninety) tablets, Refills: 3 (three)         Radiology/Test Orders:       3017F  Colorectal CA screen results documented and reviewed (PV)  (In-House)              Lab Orders:       [id number]  [id number] [facility] Lipid Panel (Excludes LDL/HDL ratio)  (Send-Out)            [id number]  [id number] [facility] Comp Metabolic Panel (14)  (Send-Out)            [id number]  [id number] [facility] Hemoglobin A1c  (Send-Out)            [id number]  [id number] [facility] TSH  (Send-Out)              Other Orders:         Depression screen negative  (In-House)                      Plan:         Encounter for screening for depression    MIPS PHQ-9 Depression Screening: Completed form scanned and in chart; Total Score 8; Positive Depression Screen but after further  evaluation the patient does not have a diagnosis of depression.            Orders:         Depression screen negative  (In-House)            3017F  Colorectal CA screen results documented and reviewed (PV)  (In-House)              Prediabetesdeclines magnesium level and ck level.    LABORATORY:  Labs ordered to be performed today at [facility] include.  CMP HgbA1C           Prescriptions:       [Refilled] metFORMIN 500 mg oral Tablet, Extended Release 24 hr [take 1 tablet (500 mg) by oral route once daily with a meal], #90 (ninety) tablets, Refills: 3 (three)           Orders:       [id number]  [id number] [facility]  ([facility])            [id number]  [id number] [facility] Hemoglobin A[facility]  (Send-Out)              Unspecified asthma, uncomplicated          Prescriptions:       [Refilled] Advair Diskus 250-50 mcg/dose Inhalation Blister, With Inhalation Device [1 puff bid], #1 (one) each, Refills: 3 (three)         Mixed hyperlipidemia    LABORATORY:  Labs ordered to be performed today at [facility] include.  Lipid panel     RECOMMENDATIONS given include: alcohol avoidance, exercise, and low cholesterol/low fat diet.            Prescriptions:       [Refilled] Simvastatin 10 mg oral tablet [1 tab daily], #90 (ninety) tablets, Refills: 3 (three)           Orders:       [id number]  [id number] [facility] Lipid Panel (Excludes LDL/HDL ratio)  (Send-Out)              Encounter for screening for other suspected endocrine disorder    LABORATORY:  Labs ordered to be performed today at [facility] include.  TSH           Orders:       [id number]  [id number] [facility] TSH  (Send-Out)                  Patient Recommendations:        For  Mixed hyperlipidemia:    Avoid alcohol as it can contribute to elevated blood pressure. Maintain a regular exercise program. Reduce the amount of cholesterol and saturated fat in your diet.              Charge Capture:         Primary Diagnosis:     Z13.31  Encounter for screening for depression           Orders:      07625   Office/outpatient visit; established patient, level 4  (In-House)              Depression screen negative  (In-House)            3017F  Colorectal CA screen results documented and reviewed (PV)  (In-House)              R73.03  Prediabetes     J45.909  Unspecified asthma, uncomplicated     E78.2  Mixed hyperlipidemia     Z13.29  Encounter for screening for other suspected endocrine disorder

## 2021-05-18 NOTE — PROGRESS NOTES
Messi Radha AURA 1965     Office/Outpatient Visit    Visit Date: Fri, Mar 9, 2018 08:30 am    Provider: Danae To N.P. (Assistant: Amanda Cheng MA)    Location: [facility]        Electronically signed by Danae To N.P. on  03/13/2018 08:28:32 AM                             SUBJECTIVE:        CC:     Jenn is a 52 year old White female.  This is a follow-up visit.  med refills;         HPI: Pt sees Dr. Villagran in [place], had well woman .         Patient to be evaluated for hyperlipidemia.  States doing well on med. Here for f/u and refills. She has been eating a low carb diet and doing well.          Dx with hypothyroidism-2015; pt had one tsh elevation in 2015 and since has been normal.          GERD details; doing well on med. Here for f/u and refills.      ROS:     CONSTITUTIONAL:  Negative for chills, fatigue, fever, and weight change.      EYES:  Negative for blurred vision.      CARDIOVASCULAR:  Negative for chest pain, orthopnea, paroxysmal nocturnal dyspnea and pedal edema.      RESPIRATORY:  Negative for dyspnea.      GASTROINTESTINAL:  Negative for abdominal pain, constipation, diarrhea, nausea and vomiting.      NEUROLOGICAL:  Negative for dizziness, headaches, paresthesias, and weakness.      PSYCHIATRIC:  Negative for anxiety, depression, and sleep disturbances.          PMH/FMH/SH:     Last Reviewed on 3/09/2018 09:02 AM by Danae To    Past Medical History:         Asthma         PREVENTIVE HEALTH MAINTENANCE             COLONOSCOPY: Done within the last 10 years was last done 2010 with normal results     MAMMOGRAM: was last done 2016 with normal results [place]     BONE DENSITY: has never been done     PAP SMEAR: was last done 2016 with normal results [place]     PNEUMOCOCCAL 23 VACCINE: has never been done     Prevnar 13: has never been done     INFLUENZA VACCINE: was last done 2016     EYE EXAM: was last done 2015 normal     EKG: has  never been done         Surgical History:         Tonsillectomy/Adenoidectomy      EGD;         Family History:         Positive for Hypertension ( mother ).      Positive for Lung Cancer ( father ).      Positive for Type 2 Diabetes ( mother ).          Social History:     Occupation: [employer]     Marital Status:      Children: 5 children         Tobacco/Alcohol/Supplements:     Last Reviewed on 3/09/2018 09:02 AM by Danae To    Tobacco: She has never smoked.          Alcohol: rare         Substance Abuse History:     Last Reviewed on 3/09/2018 09:02 AM by Danae To    NEGATIVE         Mental Health History:     Last Reviewed on 3/09/2018 09:02 AM by Danae To        Communicable Diseases (eg STDs):     Last Reviewed on 3/09/2018 09:02 AM by Danae To            Current Problems:     Last Reviewed on 3/09/2018 09:02 AM by Danae To    Back pain     Eczema     GERD     Hyperlipidemia     Hypothyroidism     Vitamin D deficiency, NOS     Unspecified deficiency anemia, NOS     Constipation     Ulcerative colitis     Family history of diabetes mellitus         Immunizations:     Fluzone pf-quadrivalent 3 and up 11/4/2016     Fluzone (3 + years dose) 11/2/2009     Fluzone (3 + years dose) 1/10/2013     FluMist 10/17/2008     FluMist 11/5/2010     FluMist 10/24/2011     Flumist Quadrivalent 12/20/2013     Fluzone High-Dose pf (>=65 yr) 11/2/2017     Adacel (Tdap) 10/23/2015         Allergies:     Last Reviewed on 3/09/2018 09:02 AM by Danae To    Codeine Sulfate:        Current Medications:     Last Reviewed on 3/09/2018 09:02 AM by Danae To    Simvastatin 10mg Tablet 1 tab daily     Protonix 20mg Tablets, Delayed Release Take 1 tablet(s) by mouth daily     Vitamin D3 1,000IU Capsules take 1 capsule daily     Docusate Sodium 100mg Capsules 1 cap po every day prn     Miralax Powder for Oral Solution 1 capful (17g) daily, mixed in 4-8oz of liquid  daily         OBJECTIVE:        Vitals:         Current: 3/9/2018 8:34:09 AM    Ht:  5 ft, 8 in;  Wt: 170 lbs;  BMI: 25.8    T: 97.6 F (oral);  BP: 114/72 mm Hg (right arm, sitting);  P: 85 bpm (right arm (BP Cuff), sitting);  sCr: 0.84 mg/dL;  GFR: 84.59        Exams:     PHYSICAL EXAM:     GENERAL: vital signs recorded - well developed, well nourished;  no apparent distress;     EYES: extraocular movements intact; conjunctiva and cornea are normal; PERRLA;     NECK: range of motion is normal; thyroid is non-palpable;     RESPIRATORY: normal respiratory rate and pattern with no distress; normal breath sounds with no rales, rhonchi, wheezes or rubs;     CARDIOVASCULAR: normal rate; rhythm is regular;  no systolic murmur; no edema;     GASTROINTESTINAL: nontender; normal bowel sounds; no organomegaly;     NEUROLOGICAL:  cranial nerves, motor and sensory function, reflexes, gait and coordination are all intact;     PSYCHIATRIC:  appropriate affect and demeanor; normal speech pattern; grossly normal memory;         ASSESSMENT:           272.4   E78.4  Hyperlipidemia              DDx:     V73.89   Z11.59  Screening test for viral diseases - other              DDx:     244.9   E03.9  Hypothyroidism-2015              DDx:     530.81   K21.0  GERD              DDx:         ORDERS:         Meds Prescribed:       Refill of: Simvastatin 10mg Tablet 1 tab daily  #90 (Ninety) tablet(s) Refills: 3       Refill of: Protonix (Pantoprazole) 20mg Tablets, Delayed Release Take 1 tablet(s) by mouth daily  #90 (Ninety) tablet(s) Refills: 3         Lab Orders:       [id number]  [id number] [facility] CBC with Differential/Platelet  (Send-Out)         [id number]  [id number] [facility] Comp Metabolic Panel (14)  (Send-Out)         [id number]  [id number] [facility] Lipid panel with LDL/HDL ratio  (Send-Out)         77332  RIBBA [facility] Hepatitis C antibody with reflex  (Send-Out)         [id number]  [id number] [facility] TSH  (Send-Out)                   PLAN:          Hyperlipidemia      LABORATORY:  Labs ordered to be performed today include CBC, Comprehensive metabolic panel, and lipid panel.      FOLLOW-UP: in 1 year.   Chronic visit follow up           Prescriptions:       Refill of: Simvastatin 10mg Tablet 1 tab daily  #90 (Ninety) tablet(s) Refills: 3           Orders:       [id number]  [id number] [facility] with Differential/Platelet  (Send-Out)         [id number]  [id number] [facility] Comp Metabolic Panel (14)  (Send-Out)         [id number]  [id number] [facility] Lipid panel with LDL/HDL ratio  (Send-Out)             Patient Education Handouts:       Hyperlipidemia (Hyperlipoproteinemia)           Screening test for viral diseases - other     LABORATORY:  Labs ordered to be performed today include Hepatitis C antibody with reflex.            Orders:       [id number]  RIBBA - [facility] Hepatitis C antibody with reflex  (Send-Out)            Hypothyroidism-2015     LABORATORY:  Labs ordered to be performed today at [facility] include.  TSH           Orders:       [id number]  [id number] [facility] TSH  (Send-Out)            GERD           Prescriptions:       Refill of: Protonix (Pantoprazole) 20mg Tablets, Delayed Release Take 1 tablet(s) by mouth daily  #90 (Ninety) tablet(s) Refills: 3             Patient Recommendations:        For  Hyperlipidemia:                     APPOINTMENT INFORMATION:        Monday Tuesday Wednesday Thursday Friday Saturday Sunday            Time:___________________AM  PM   Date:_____________________             CHARGE CAPTURE:           Primary Diagnosis:     272.4 Hyperlipidemia            E78.4    Other hyperlipidemia              Orders:          74996   Office/outpatient visit; established patient, level 4  (In-House)           V73.89 Screening test for viral diseases - other            Z11.59    Encounter for screening for other viral diseases    244.9 Hypothyroidism-2015            E03.9    Hypothyroidism, unspecified    530.81 GERD            K21.0    Gastro-esophageal reflux disease with esophagitis

## 2021-05-18 NOTE — PROGRESS NOTES
Messi Radha AURA 1965     Office/Outpatient Visit    Visit Date: Mon, Mar 4, 2019 09:48 am    Provider: Danae To N.P. (Assistant: Rox Fox MA)    Location: [facility]        Electronically signed by Danae To N.P. on  03/05/2019 08:40:04 AM                             SUBJECTIVE:        CC:     Jenn is a 53 year old White female.  Patient presents today for medication refills;         HPI:         Patient presents with hyperlipidemia.  Pt states doing well on med. Here for f/u and refills.          In regard to the hypothyroidism-2015, states doing well.          In regard to the gERD, states doing well on med. Here for f/u and refills. She understands the risk of bone demineralization and is taking calcium with Vit D supplement.          Concerning health checkup, her last physical exam was 2 years ago.  She cannot recall the date of her last menstrual period.  She is not currently using any form of contraception.  She performs breast self-exams occasionally.    Preventative Health updated today.  She is current with her influenza immunization.  Tobacco: She has never smoked.  She is due and scheduled for her well woman exam.     ROS:     CONSTITUTIONAL:  Negative for chills, fatigue, fever, and weight change.      EYES:  Negative for blurred vision.      CARDIOVASCULAR:  Negative for chest pain, orthopnea, paroxysmal nocturnal dyspnea and pedal edema.      RESPIRATORY:  Negative for dyspnea.      GASTROINTESTINAL:  Negative for abdominal pain, constipation, diarrhea, nausea and vomiting.      MUSCULOSKELETAL:  Negative for arthralgias, back pain, and myalgias.      NEUROLOGICAL:  Negative for dizziness, headaches, paresthesias, and weakness.      PSYCHIATRIC:  Negative for anxiety, depression, and sleep disturbances.          PMH/FMH/SH:     Last Reviewed on 3/04/2019 10:08 AM by Danae To    Past Medical History:         Asthma         PREVENTIVE HEALTH  MAINTENANCE             COLONOSCOPY: Done within the last 10 years was last done 2010 with normal results     MAMMOGRAM: was last done 2016 with normal results [place]     BONE DENSITY: has never been done     PAP SMEAR: was last done 2016 with normal results [place]     PNEUMOCOCCAL 23 VACCINE: has never been done     Prevnar 13: has never been done     INFLUENZA VACCINE: was last done 2016     EYE EXAM: was last done 2015 normal     EKG: has never been done         Surgical History:         Tonsillectomy/Adenoidectomy      EGD;         Family History:         Positive for Hypertension ( mother ).      Positive for Lung Cancer ( father ).      Positive for Type 2 Diabetes ( mother ).          Social History:     Occupation: [employer]     Marital Status:      Children: 5 children         Tobacco/Alcohol/Supplements:     Last Reviewed on 3/04/2019 10:08 AM by Danae To    Tobacco: She has never smoked.          Alcohol: rare         Substance Abuse History:     Last Reviewed on 3/04/2019 10:08 AM by Danae To    NEGATIVE         Mental Health History:     Last Reviewed on 3/04/2019 10:08 AM by Danae To        Communicable Diseases (eg STDs):     Last Reviewed on 3/04/2019 10:08 AM by Danae To            Current Problems:     Last Reviewed on 3/04/2019 10:08 AM by Danae To    Back pain     Eczema     GERD     Hyperlipidemia     Hypothyroidism-2015     Vitamin D deficiency, NOS     Anemia-history 2015     Constipation     Ulcerative colitis     Family history of diabetes mellitus         Immunizations:     zzFluzone pf-quadrivalent 3 and up 11/4/2016     Fluzone (3 + years dose) 11/2/2009     Fluzone (3 + years dose) 1/10/2013     Fluzone Quadrivalent (3+ years) 10/22/2018     FluMist 10/17/2008     FluMist 11/5/2010     FluMist 10/24/2011     Flumist Quadrivalent 12/20/2013     Fluzone High-Dose pf (>=65 yr) 11/2/2017     Adacel (Tdap) 10/23/2015          Allergies:     Last Reviewed on 3/04/2019 10:08 AM by Danae To    Codeine Sulfate:        Current Medications:     Last Reviewed on 3/04/2019 10:08 AM by Danae To    Simvastatin 10mg Tablet 1 tab daily     Vitamin D3 1,000IU Capsules take 1 capsule daily     Docusate Sodium 100mg Capsules 1 cap po every day prn     Miralax Powder for Oral Solution 1 capful (17g) daily, mixed in 4-8oz of liquid daily         OBJECTIVE:        Vitals:         Current: 3/4/2019 9:52:03 AM    Ht:  5 ft, 8 in;  Wt: 177.8 lbs;  BMI: 27.0    T: 97.9 F (oral);  BP: 128/73 mm Hg (left arm, sitting);  P: 84 bpm (left arm (BP Cuff), sitting);  sCr: 0.95 mg/dL;  GFR: 75.38        Exams:     PHYSICAL EXAM:     GENERAL: vital signs recorded - well developed, well nourished;  no apparent distress;     EYES: extraocular movements intact; conjunctiva and cornea are normal; PERRLA;     E/N/T:  normal EACs, TMs, nasal/oral mucosa, teeth, gingiva, and oropharynx;     NECK: range of motion is normal; thyroid is non-palpable;     RESPIRATORY: normal respiratory rate and pattern with no distress; normal breath sounds with no rales, rhonchi, wheezes or rubs;     CARDIOVASCULAR: normal rate; rhythm is regular;  no systolic murmur; no edema;     GASTROINTESTINAL: nontender; normal bowel sounds; no organomegaly;     MUSCULOSKELETAL:  Normal range of motion, strength and tone;     NEUROLOGICAL:  cranial nerves, motor and sensory function, reflexes, gait and coordination are all intact;     PSYCHIATRIC:  appropriate affect and demeanor; normal speech pattern; grossly normal memory;         ASSESSMENT:           272.4   J20.8  Hyperlipidemia              DDx:     244.9   E03.9  Hypothyroidism-2015              DDx:     530.81   K21.0  GERD              DDx:     V70.0   Z00.00  Health checkup              DDx:     278.02   E66.3  Overweight              DDx:         ORDERS:         Meds Prescribed:       Refill of: Simvastatin 10mg Tablet 1 tab  daily  #90 (Ninety) tablet(s) Refills: 3       Protonix (Pantoprazole)  20mg Tablets, Delayed Release Take 1 tablet(s) by mouth daily  #90 (Ninety) tablet(s) Refills: 3         Radiology/Test Orders:       3014F  Screening mammography results documented and reviewed (PV)1  (In-House)         3017F  Colorectal CA screen results documented and reviewed (PV)  (In-House)           Lab Orders:       FUTURE  Future order to be done at patients convenience  (Send-Out)         [id number]  [id number] [facility] CBC with Differential/Platelet  (Send-Out)         [id number]  [id number] [facility] Comp Metabolic Panel (14)  (Send-Out)         [id number]  [id number] [facility] Lipid Panel with reflex  (Send-Out)         [id number]  [id number] [facility] TSH  (Send-Out)           Other Orders:         Negative EtOH screen  (In-House)           Calculated BMI above the upper parameter and a follow-up plan was documented in the medical record  (In-House)                   PLAN:          Hyperlipidemia     MIPS PHQ-9 Depression Screening Completed form scanned and in chart; Total Score 0 Negative alcohol screen     MAMMOGRAM: Done within last 2 years and results in are chart     COLORECTAL CANCER SCREENING: Results are in chart     BMI Elevated - Follow-Up Plan: She was provided education on weight loss strategies     FOLLOW-UP TESTING #1: FOLLOW-UP LABORATORY:  Labs to be scheduled in the future include CBC.  CMP Lipid panel TSH           Prescriptions:       Refill of: Simvastatin 10mg Tablet 1 tab daily  #90 (Ninety) tablet(s) Refills: 3           Orders:       FUTURE  Future order to be done at patients convenience  (Send-Out)         [id number]  [id number] [facility] CBC with Differential/Platelet  (Send-Out)         [id number]  [id number] [facility] Comp Metabolic Panel (14)  (Send-Out)         [id number]  [id number] [facility] Lipid Panel with reflex  (Send-Out)         52015  [id number] [facility] TSH  (Send-Out)           Negative EtOH screen  (In-House)         3014F  Screening mammography results  documented and reviewed (PV)1  (In-House)         3017F  Colorectal CA screen results documented and reviewed (PV)  (In-House)           Calculated BMI above the upper parameter and a follow-up plan was documented in the medical record  (In-House)            GERD           Prescriptions:       Protonix (Pantoprazole)  20mg Tablets, Delayed Release Take 1 tablet(s) by mouth daily  #90 (Ninety) tablet(s) Refills: 3          Overweight           Patient Education Handouts:       Obesity Weight-loss and Nutrition Myths  PTC              CHARGE CAPTURE:           Primary Diagnosis:     272.4 Hyperlipidemia            J20.8    Acute bronchitis due to other specified organisms              Orders:          29971 -25  Office/outpatient visit; established patient, level 3  (In-House)                Negative EtOH screen  (In-House)             3014F   Screening mammography results documented and reviewed (PV)1  (In-House)             3017F   Colorectal CA screen results documented and reviewed (PV)  (In-House)                Calculated BMI above the upper parameter and a follow-up plan was documented in the medical record  (In-House)           244.9 Hypothyroidism-2015            E03.9    Hypothyroidism, unspecified    530.81 GERD            K21.0    Gastro-esophageal reflux disease with esophagitis    V70.0 Health checkup            Z00.00    Encounter for general adult medical examination without abnormal findings              Orders:          37102   Preventive medicine, established patient, age 40-64 years  (In-House)           278.02 Overweight            E66.3    Overweight

## 2021-07-01 VITALS
SYSTOLIC BLOOD PRESSURE: 111 MMHG | HEART RATE: 77 BPM | BODY MASS INDEX: 25.49 KG/M2 | HEIGHT: 68 IN | WEIGHT: 168.2 LBS | DIASTOLIC BLOOD PRESSURE: 76 MMHG | TEMPERATURE: 97 F

## 2021-07-01 VITALS
TEMPERATURE: 97.6 F | HEART RATE: 85 BPM | BODY MASS INDEX: 25.76 KG/M2 | SYSTOLIC BLOOD PRESSURE: 114 MMHG | WEIGHT: 170 LBS | HEIGHT: 68 IN | DIASTOLIC BLOOD PRESSURE: 72 MMHG

## 2021-07-01 VITALS
TEMPERATURE: 98.3 F | BODY MASS INDEX: 26.28 KG/M2 | WEIGHT: 173.4 LBS | HEART RATE: 73 BPM | HEIGHT: 68 IN | DIASTOLIC BLOOD PRESSURE: 71 MMHG | SYSTOLIC BLOOD PRESSURE: 125 MMHG

## 2021-07-01 VITALS
BODY MASS INDEX: 25.04 KG/M2 | TEMPERATURE: 97.8 F | DIASTOLIC BLOOD PRESSURE: 75 MMHG | SYSTOLIC BLOOD PRESSURE: 119 MMHG | HEART RATE: 91 BPM | HEIGHT: 68 IN | WEIGHT: 165.2 LBS

## 2021-07-01 VITALS
DIASTOLIC BLOOD PRESSURE: 73 MMHG | HEART RATE: 84 BPM | WEIGHT: 177.8 LBS | BODY MASS INDEX: 26.95 KG/M2 | SYSTOLIC BLOOD PRESSURE: 128 MMHG | HEIGHT: 68 IN | TEMPERATURE: 97.9 F

## 2021-07-01 VITALS
SYSTOLIC BLOOD PRESSURE: 116 MMHG | TEMPERATURE: 97.4 F | HEIGHT: 68 IN | HEART RATE: 71 BPM | DIASTOLIC BLOOD PRESSURE: 77 MMHG | BODY MASS INDEX: 25.7 KG/M2 | WEIGHT: 169.6 LBS

## 2021-07-02 VITALS
TEMPERATURE: 97.4 F | WEIGHT: 161 LBS | HEART RATE: 81 BPM | SYSTOLIC BLOOD PRESSURE: 118 MMHG | HEIGHT: 68 IN | BODY MASS INDEX: 24.4 KG/M2 | DIASTOLIC BLOOD PRESSURE: 82 MMHG

## 2021-07-02 VITALS
SYSTOLIC BLOOD PRESSURE: 118 MMHG | BODY MASS INDEX: 27.28 KG/M2 | HEIGHT: 68 IN | DIASTOLIC BLOOD PRESSURE: 79 MMHG | WEIGHT: 180 LBS | HEART RATE: 90 BPM | TEMPERATURE: 96.3 F

## 2021-08-17 ENCOUNTER — TELEPHONE (OUTPATIENT)
Dept: FAMILY MEDICINE CLINIC | Age: 56
End: 2021-08-17

## 2021-08-17 NOTE — TELEPHONE ENCOUNTER
Caller: Radha Swenson    Relationship: Self    Best call back number: [phone]    Medication needed:     SIMVASTATIN 10 MG     METFORMIN 500 MG     When do you need the refill by: 8/2721    What additional details did the patient provide when requesting the medication: 2 WEEKS LEFT SIMVASTATIN AND 2 MONTHS ON METFORMIN     Does the patient have less than a 3 day supply:  [] Yes  [x] No    What is the patient's preferred pharmacy: [facility] - [place] - [address] [place] [address] [phone] [facility] [phone] FX

## 2021-08-26 NOTE — TELEPHONE ENCOUNTER
Caller: Radha Swenson    Relationship:     Best call back number:[phone]    What is the best time to reach you: ANYTIME    Who are you requesting to speak with (clinical staff, provider,  specific staff member): CLINICAL      What was the call regarding: SEE PRIOR MESSAGE REGARDING 2 MED REFILLS. PATIENT HAS NEVER HEARD ANYTHING BACK AND PHARMACY DOESN'T HAVE EITHER. PLEASE ADVISE.    Do you require a callback: YES

## 2021-08-30 RX ORDER — SIMVASTATIN 10 MG
1 TABLET ORAL DAILY
COMMUNITY
Start: 2021-06-03 | End: 2021-08-30 | Stop reason: SDUPTHER

## 2021-08-30 RX ORDER — SIMVASTATIN 10 MG
10 TABLET ORAL DAILY
Qty: 30 TABLET | Refills: 0 | Status: SHIPPED | OUTPATIENT
Start: 2021-08-30 | End: 2021-09-08 | Stop reason: SDUPTHER

## 2021-08-30 RX ORDER — METFORMIN HYDROCHLORIDE 500 MG/1
1 TABLET, EXTENDED RELEASE ORAL 2 TIMES DAILY
COMMUNITY
Start: 2021-07-08 | End: 2021-09-08 | Stop reason: SDUPTHER

## 2021-08-30 NOTE — TELEPHONE ENCOUNTER
Spoke to pt, advised that she is due for appt and she doesn't need refill on metformin this early.  Scheduled appt an refilled Simvastatin

## 2021-09-08 ENCOUNTER — TELEMEDICINE (OUTPATIENT)
Dept: FAMILY MEDICINE CLINIC | Age: 56
End: 2021-09-08

## 2021-09-08 DIAGNOSIS — E78.2 MIXED HYPERLIPIDEMIA: ICD-10-CM

## 2021-09-08 DIAGNOSIS — E11.9 TYPE 2 DIABETES MELLITUS WITHOUT COMPLICATION, WITHOUT LONG-TERM CURRENT USE OF INSULIN (HCC): Primary | ICD-10-CM

## 2021-09-08 PROCEDURE — 99213 OFFICE O/P EST LOW 20 MIN: CPT | Performed by: NURSE PRACTITIONER

## 2021-09-08 RX ORDER — METFORMIN HYDROCHLORIDE 500 MG/1
500 TABLET, EXTENDED RELEASE ORAL 2 TIMES DAILY
Qty: 180 TABLET | Refills: 3 | Status: SHIPPED | OUTPATIENT
Start: 2021-09-08 | End: 2022-09-12

## 2021-09-08 RX ORDER — SIMVASTATIN 10 MG
10 TABLET ORAL DAILY
Qty: 90 TABLET | Refills: 3 | Status: SHIPPED | OUTPATIENT
Start: 2021-09-08 | End: 2022-09-12

## 2021-09-08 NOTE — ASSESSMENT & PLAN NOTE
Continue current treatment and is due for fasting lab work.  Patient request lab orders to be faxed to [facility] on [facility]

## 2021-09-08 NOTE — ASSESSMENT & PLAN NOTE
Prediabetes is a hemoglobin A1c of 5.8-6.4.  Type 2 diabetes is hemoglobin A1c of 6.5 or higher.  Goal for a well-controlled diabetic is hemoglobin A1c of 7 or below.  Therefore treatment will not change.  Continue Metformin, low sugar diet and exercise.  Recommend fasting lab work every 6 months for proper management.  Request 1 years worth of medication refills.  Verbalizes understanding that labs every 6 months are recommended.

## 2021-09-08 NOTE — PROGRESS NOTES
Chief Complaint  Prediabetes (Video Visit ( [facility] - [phone] ) ) and Hyperlipidemia    Subjective  Radha is a 56-year-old female that has provided consent to 2 witnesses to perform a televideo visit today.  This is for follow-up on prediabetes and high cholesterol.  She is taking Metformin extended release 500 mg 1 tablet twice daily.  She does not routinely check her blood sugar levels.  Is eating healthier and increasing her exercise.  She denies medication side effects.  And her hemoglobin A1c was 6.9% in February.  Regarding hyperlipidemia her most recent cholesterol levels were 151 total cholesterol, triglycerides 120, HDL cholesterol 45 and LDL cholesterol 84.  She remains on simvastatin 10 mg once daily.  Denies side effects and requests refills.  She has had 2 colonoscopies that were normal with her most recent taking place about 8 years ago.  Gynecology orders mammograms and performs Pap smears.  Mammogram and Pap smear was negative in May 2021.        Radha Swenson presents to [facility]    Review of Systems   Constitutional: Negative.    Respiratory: Negative.    Cardiovascular: Negative.    Musculoskeletal: Negative.    Neurological: Negative.         Objective   Vital Signs:   There were no vitals filed for this visit.   Physical Exam  Constitutional:       Appearance: Normal appearance.   Neurological:      General: No focal deficit present.      Mental Status: She is alert.   Psychiatric:         Mood and Affect: Mood normal.          Result Review :                Assessment and Plan    Diagnoses and all orders for this visit:    1. Type 2 diabetes mellitus without complication, without long-term current use of insulin (CMS/[facility]) (Primary)  Assessment & Plan:  Prediabetes is a hemoglobin A1c of 5.8-6.4.  Type 2 diabetes is hemoglobin A1c of 6.5 or higher.  Goal for a well-controlled diabetic is hemoglobin A1c of 7 or below.  Therefore treatment will not  change.  Continue Metformin, low sugar diet and exercise.  Recommend fasting lab work every 6 months for proper management.  Request 1 years worth of medication refills.  Verbalizes understanding that labs every 6 months are recommended.    Orders:  -     Comprehensive metabolic panel; Future  -     Hemoglobin A1c; Future  -     Lipid panel; Future  -     metFORMIN ER (GLUCOPHAGE-XR) 500 MG 24 hr tablet; Take 1 tablet by mouth 2 (two) times a day.  Dispense: 180 tablet; Refill: 3    2. Mixed hyperlipidemia  Assessment & Plan:  Continue current treatment and is due for fasting lab work.  Patient request lab orders to be faxed to [facility]    Orders:  -     simvastatin (ZOCOR) 10 MG tablet; Take 1 tablet by mouth Daily.  Dispense: 90 tablet; Refill: 3      Follow Up    No follow-ups on file.  Patient was given instructions and counseling regarding her condition or for health maintenance advice. Please see specific information pulled into the AVS if appropriate.

## 2021-09-18 LAB
ALBUMIN SERPL-MCNC: 4.5 G/DL (ref 3.8–4.9)
ALBUMIN/GLOB SERPL: 1.8 {RATIO} (ref 1.2–2.2)
ALP SERPL-CCNC: 75 IU/L (ref 44–121)
ALT SERPL-CCNC: 17 IU/L (ref 0–32)
AMBIG ABBREV CMP14 DEFAULT: NORMAL
AMBIG ABBREV LP DEFAULT: NORMAL
AST SERPL-CCNC: 17 IU/L (ref 0–40)
BILIRUB SERPL-MCNC: 0.3 MG/DL (ref 0–1.2)
BUN SERPL-MCNC: 19 MG/DL (ref 6–24)
BUN/CREAT SERPL: 19 (ref 9–23)
CALCIUM SERPL-MCNC: 9.9 MG/DL (ref 8.7–10.2)
CHLORIDE SERPL-SCNC: 102 MMOL/L (ref 96–106)
CHOLEST SERPL-MCNC: 149 MG/DL (ref 100–199)
CO2 SERPL-SCNC: 26 MMOL/L (ref 20–29)
CREAT SERPL-MCNC: 1.01 MG/DL (ref 0.57–1)
GLOBULIN SER CALC-MCNC: 2.5 G/DL (ref 1.5–4.5)
GLUCOSE SERPL-MCNC: 103 MG/DL (ref 65–99)
HBA1C MFR BLD: 6.6 % (ref 4.8–5.6)
HDLC SERPL-MCNC: 48 MG/DL
LDLC SERPL CALC-MCNC: 79 MG/DL (ref 0–99)
POTASSIUM SERPL-SCNC: 4.5 MMOL/L (ref 3.5–5.2)
PROT SERPL-MCNC: 7 G/DL (ref 6–8.5)
SODIUM SERPL-SCNC: 142 MMOL/L (ref 134–144)
TRIGL SERPL-MCNC: 121 MG/DL (ref 0–149)
VLDLC SERPL CALC-MCNC: 22 MG/DL (ref 5–40)

## 2021-09-20 NOTE — PROGRESS NOTES
Blood sugar upper limits of normal.  Kidney function improved.  Liver function normal.  Cholesterol is normal.  Hemoglobin a1c improved.  Continue current treatment.

## 2021-10-29 ENCOUNTER — OFFICE VISIT (OUTPATIENT)
Dept: FAMILY MEDICINE CLINIC | Age: 56
End: 2021-10-29

## 2021-10-29 VITALS
WEIGHT: 167.8 LBS | DIASTOLIC BLOOD PRESSURE: 72 MMHG | SYSTOLIC BLOOD PRESSURE: 118 MMHG | HEIGHT: 68 IN | OXYGEN SATURATION: 98 % | BODY MASS INDEX: 25.43 KG/M2 | TEMPERATURE: 98 F | HEART RATE: 85 BPM

## 2021-10-29 DIAGNOSIS — R21 RASH: ICD-10-CM

## 2021-10-29 DIAGNOSIS — R59.0 LYMPHADENOPATHY, AXILLARY: Primary | ICD-10-CM

## 2021-10-29 PROCEDURE — 99213 OFFICE O/P EST LOW 20 MIN: CPT | Performed by: NURSE PRACTITIONER

## 2021-10-29 NOTE — ASSESSMENT & PLAN NOTE
Will start with lab work initially.  No musculoskeletal pain on exam today.  X-ray of affected areas in the future may be helpful.

## 2021-10-29 NOTE — PROGRESS NOTES
"Chief Complaint  Rash (under left arm )    Subjective  Radha is a 56-year-old female here today with reports of intermittent rash under her left arm or on the back of her left shoulder off and on since the first of the year.  Resolves within 2 to 3 days.  Applying a cream often helps but notices rash resolves with or without the cream.  Intermittent burning stinging pain of the left inner arm and concerns that the left armpit area looks more swollen or feels as if she may have had some lymph node enlargement off and on.  Denies fatigue or fever.  When she sleeping at night she will feel some pain along the left upper pelvic bone that does not necessarily localized to the hip.  She has had no injuries.  She did have abnormal mammogram at her gynecologist office in May 2021.  Her sister was recently diagnosed with breast cancer which increases her anxiety about any changes in the breast or armpit area.  She denies any breast changes.  No current rash.  Does not feel as if she has any diffuse joint or muscle pain.  Her intermittent symptoms do resolve with ibuprofen or Aleve.  She is does not want to take those medicines regularly for concern of effect on her kidney function.        Radha Swenson presents to [facility]    Review of Systems   Constitutional: Negative.    Respiratory: Negative.    Cardiovascular: Negative.    Gastrointestinal: Negative.    Musculoskeletal:        Intermittent burning stinging pain left shoulder left arm or left side.  Intermittent left upper pelvic bone pain with she sleeps at night.   Skin: Positive for rash.   Neurological: Negative.    Hematological: Positive for adenopathy.        Objective   Vital Signs:   Vitals:    10/29/21 0814   BP: 118/72   BP Location: Right arm   Patient Position: Sitting   Cuff Size: Adult   Pulse: 85   Temp: 98 °F (36.7 °C)   TempSrc: Oral   SpO2: 98%   Weight: 76.1 kg (167 lb 12.8 oz)   Height: 172.7 cm (68\")    "   Physical Exam  Vitals reviewed.   Constitutional:       General: She is not in acute distress.     Appearance: Normal appearance. She is well-developed.   Cardiovascular:      Rate and Rhythm: Normal rate and regular rhythm.      Heart sounds: Normal heart sounds.   Pulmonary:      Effort: Pulmonary effort is normal.      Breath sounds: Normal breath sounds.   Chest:   Breasts:      Right: No swelling or axillary adenopathy.      Left: No swelling or axillary adenopathy.       Musculoskeletal:      Right lower leg: No edema.      Left lower leg: No edema.   Lymphadenopathy:      Upper Body:      Right upper body: No axillary adenopathy.      Left upper body: No axillary adenopathy.   Skin:     General: Skin is warm and dry.   Neurological:      General: No focal deficit present.      Mental Status: She is alert.   Psychiatric:         Attention and Perception: Attention normal.         Mood and Affect: Mood and affect normal.         Behavior: Behavior normal.          Result Review :                Assessment and Plan    Diagnoses and all orders for this visit:    1. Lymphadenopathy, axillary (Primary)  Assessment & Plan:  No concern on exam as far as lymph node enlargement.  She does state the left axilla is a little tender to touch.  Even though she had a normal mammogram May 2021 a left diagnostic mammogram may be necessary or suggested per radiologist.  Patient verbalizes understanding.  Report any fever or fatigue.    Orders:  -     Cancel: US Axilla Left; Future  -     CBC w AUTO Differential; Future  -     US Axilla Left; Future  -     Mammo Diagnostic Digital Tomosynthesis Bilateral With CAD; Future    2. Rash  Assessment & Plan:  Will start with lab work initially.  No musculoskeletal pain on exam today.  X-ray of affected areas in the future may be helpful.    Orders:  -     IVANA Direct Reflex to 11 Biomarker; Future  -     Cyclic citrul peptide antibody, IgG/IgA; Future  -     CK; Future  -      C-reactive protein; Future  -     Rheumatoid factor; Future  -     Sedimentation rate; Future      Follow Up    No follow-ups on file.  Patient was given instructions and counseling regarding her condition or for health maintenance advice. Please see specific information pulled into the AVS if appropriate.

## 2021-10-29 NOTE — ASSESSMENT & PLAN NOTE
No concern on exam as far as lymph node enlargement.  She does state the left axilla is a little tender to touch.  Even though she had a normal mammogram May 2021 a left diagnostic mammogram may be necessary or suggested per radiologist.  Patient verbalizes understanding.  Report any fever or fatigue.

## 2021-11-02 LAB
ANA HOMOGEN TITR SER: ABNORMAL {TITER}
ANA TITR SER IF: POSITIVE {TITER}
BASOPHILS # BLD AUTO: 0.1 X10E3/UL (ref 0–0.2)
BASOPHILS NFR BLD AUTO: 2 %
CCP IGA+IGG SERPL IA-ACNC: 9 UNITS (ref 0–19)
CENTROMERE B AB SER-ACNC: <0.2 AI (ref 0–0.9)
CHROMATIN AB SERPL-ACNC: <0.2 AI (ref 0–0.9)
CK SERPL-CCNC: 68 U/L (ref 32–182)
CRP SERPL-MCNC: 6 MG/L (ref 0–10)
DSDNA AB SER-ACNC: 1 IU/ML (ref 0–9)
ENA JO1 AB SER-ACNC: <0.2 AI (ref 0–0.9)
ENA RNP AB SER-ACNC: 0.8 AI (ref 0–0.9)
ENA SCL70 AB SER-ACNC: <0.2 AI (ref 0–0.9)
ENA SM AB SER-ACNC: <0.2 AI (ref 0–0.9)
ENA SM+RNP AB SER-ACNC: <0.2 AI (ref 0–0.9)
ENA SS-A AB SER-ACNC: <0.2 AI (ref 0–0.9)
ENA SS-B AB SER-ACNC: <0.2 AI (ref 0–0.9)
EOSINOPHIL # BLD AUTO: 0.4 X10E3/UL (ref 0–0.4)
EOSINOPHIL NFR BLD AUTO: 8 %
ERYTHROCYTE [DISTWIDTH] IN BLOOD BY AUTOMATED COUNT: 13.6 % (ref 11.7–15.4)
ERYTHROCYTE [SEDIMENTATION RATE] IN BLOOD BY WESTERGREN METHOD: 13 MM/HR (ref 0–40)
HCT VFR BLD AUTO: 41.4 % (ref 34–46.6)
HGB BLD-MCNC: 13.9 G/DL (ref 11.1–15.9)
IMM GRANULOCYTES # BLD AUTO: 0 X10E3/UL (ref 0–0.1)
IMM GRANULOCYTES NFR BLD AUTO: 0 %
LYMPHOCYTES # BLD AUTO: 1.5 X10E3/UL (ref 0.7–3.1)
LYMPHOCYTES NFR BLD AUTO: 26 %
Lab: ABNORMAL
Lab: ABNORMAL
MCH RBC QN AUTO: 28.1 PG (ref 26.6–33)
MCHC RBC AUTO-ENTMCNC: 33.6 G/DL (ref 31.5–35.7)
MCV RBC AUTO: 84 FL (ref 79–97)
MONOCYTES # BLD AUTO: 0.3 X10E3/UL (ref 0.1–0.9)
MONOCYTES NFR BLD AUTO: 6 %
NEUTROPHILS # BLD AUTO: 3.3 X10E3/UL (ref 1.4–7)
NEUTROPHILS NFR BLD AUTO: 58 %
PLATELET # BLD AUTO: 293 X10E3/UL (ref 150–450)
RBC # BLD AUTO: 4.94 X10E6/UL (ref 3.77–5.28)
RHEUMATOID FACT SERPL-ACNC: <10 IU/ML (ref 0–13.9)
RIBOSOMAL P AB SER-ACNC: <0.2 AI (ref 0–0.9)
WBC # BLD AUTO: 5.6 X10E3/UL (ref 3.4–10.8)

## 2021-11-04 ENCOUNTER — TELEPHONE (OUTPATIENT)
Dept: FAMILY MEDICINE CLINIC | Age: 56
End: 2021-11-04

## 2021-11-04 NOTE — TELEPHONE ENCOUNTER
Caller: Radha Swenson    Relationship: Self    Best call back number:[phone]    Caller requesting test results:[id number]    What test was performed: BLOOD WORK LABS  When was the test performed: 10/29/2021    Where was the test performed: [facility]    Additional notes:PATIENT WOULD LIKE A CALL BACK TO DISCUSS HER LAB RESULTS ASAP.

## 2021-11-05 ENCOUNTER — TELEPHONE (OUTPATIENT)
Dept: FAMILY MEDICINE CLINIC | Age: 56
End: 2021-11-05

## 2021-11-05 NOTE — TELEPHONE ENCOUNTER
Caller: Radha Swenson    Relationship: Self    Best call back number: [phone]     Any additional details: PATIENT IS RETURNING MANUEL'S CALL, STATES SHE PREFER [facility] PROVID[facility] FOR THE RHEUMATOLOGY REFERRAL.

## 2021-11-09 DIAGNOSIS — R76.8 ELEVATED ANTINUCLEAR ANTIBODY (ANA) LEVEL: Primary | ICD-10-CM

## 2021-11-11 ENCOUNTER — APPOINTMENT (OUTPATIENT)
Dept: MAMMOGRAPHY | Facility: HOSPITAL | Age: 56
End: 2021-11-11

## 2021-11-11 ENCOUNTER — APPOINTMENT (OUTPATIENT)
Dept: ULTRASOUND IMAGING | Facility: HOSPITAL | Age: 56
End: 2021-11-11

## 2021-11-12 ENCOUNTER — HOSPITAL ENCOUNTER (OUTPATIENT)
Dept: MAMMOGRAPHY | Facility: HOSPITAL | Age: 56
Discharge: HOME | End: 2021-11-12

## 2021-11-12 ENCOUNTER — HOSPITAL ENCOUNTER (OUTPATIENT)
Dept: ULTRASOUND IMAGING | Facility: HOSPITAL | Age: 56
Discharge: HOME | End: 2021-11-12

## 2021-11-12 DIAGNOSIS — R59.0 LYMPHADENOPATHY, AXILLARY: ICD-10-CM

## 2021-11-12 PROCEDURE — 76882 US LMTD JT/FCL EVL NVASC XTR: CPT

## 2021-11-15 NOTE — PROGRESS NOTES
No concerning findings related to the lymph nodes of the left underarm area.  Probable reactive lymph nodes.  Report any fever or further enlargement of this area if it were to occur.

## 2022-09-06 DIAGNOSIS — E78.2 MIXED HYPERLIPIDEMIA: ICD-10-CM

## 2022-09-06 DIAGNOSIS — E11.9 TYPE 2 DIABETES MELLITUS WITHOUT COMPLICATION, WITHOUT LONG-TERM CURRENT USE OF INSULIN: ICD-10-CM

## 2022-09-12 ENCOUNTER — TELEPHONE (OUTPATIENT)
Dept: FAMILY MEDICINE CLINIC | Age: 57
End: 2022-09-12

## 2022-09-12 RX ORDER — METFORMIN HYDROCHLORIDE 500 MG/1
TABLET, EXTENDED RELEASE ORAL
Qty: 60 TABLET | Refills: 0 | Status: SHIPPED | OUTPATIENT
Start: 2022-09-12 | End: 2022-09-28 | Stop reason: SDUPTHER

## 2022-09-12 RX ORDER — SIMVASTATIN 10 MG
10 TABLET ORAL DAILY
Qty: 30 TABLET | Refills: 0 | Status: SHIPPED | OUTPATIENT
Start: 2022-09-12 | End: 2022-09-28 | Stop reason: SDUPTHER

## 2022-09-13 DIAGNOSIS — E11.9 TYPE 2 DIABETES MELLITUS WITHOUT COMPLICATION, WITHOUT LONG-TERM CURRENT USE OF INSULIN: ICD-10-CM

## 2022-09-13 DIAGNOSIS — E78.2 MIXED HYPERLIPIDEMIA: Primary | ICD-10-CM

## 2022-09-13 DIAGNOSIS — E55.9 VITAMIN D DEFICIENCY: ICD-10-CM

## 2022-09-28 ENCOUNTER — OFFICE VISIT (OUTPATIENT)
Dept: FAMILY MEDICINE CLINIC | Age: 57
End: 2022-09-28

## 2022-09-28 ENCOUNTER — TELEPHONE (OUTPATIENT)
Dept: FAMILY MEDICINE CLINIC | Age: 57
End: 2022-09-28

## 2022-09-28 VITALS
WEIGHT: 167 LBS | HEIGHT: 68 IN | OXYGEN SATURATION: 96 % | BODY MASS INDEX: 25.31 KG/M2 | DIASTOLIC BLOOD PRESSURE: 81 MMHG | HEART RATE: 83 BPM | SYSTOLIC BLOOD PRESSURE: 121 MMHG

## 2022-09-28 DIAGNOSIS — E78.2 MIXED HYPERLIPIDEMIA: ICD-10-CM

## 2022-09-28 DIAGNOSIS — E11.9 TYPE 2 DIABETES MELLITUS WITHOUT COMPLICATION, WITHOUT LONG-TERM CURRENT USE OF INSULIN: Primary | ICD-10-CM

## 2022-09-28 PROCEDURE — 99213 OFFICE O/P EST LOW 20 MIN: CPT | Performed by: NURSE PRACTITIONER

## 2022-09-28 RX ORDER — METFORMIN HYDROCHLORIDE 500 MG/1
500 TABLET, EXTENDED RELEASE ORAL 2 TIMES DAILY
Qty: 180 TABLET | Refills: 1 | Status: SHIPPED | OUTPATIENT
Start: 2022-09-28

## 2022-09-28 RX ORDER — SIMVASTATIN 10 MG
10 TABLET ORAL DAILY
Qty: 90 TABLET | Refills: 1 | Status: SHIPPED | OUTPATIENT
Start: 2022-09-28

## 2022-09-28 NOTE — ASSESSMENT & PLAN NOTE
Labs drawn this morning and results are pending.  Further treatment pending lab results.  Recommend an annual eye exam.

## 2022-09-28 NOTE — TELEPHONE ENCOUNTER
Spoke with pt regarding overdue lab orders. [facility] Awaiting results.

## 2022-09-28 NOTE — PROGRESS NOTES
"Chief Complaint  Diabetes (Follow up ) and Hyperlipidemia    Subjective  mail paper copy of labs    Patient is 57-year-old female who is here today to follow-up regarding type 2 diabetes.  She is taking metformin extended release 500 mg twice daily.  Sometimes will have a loose stool after nighttime dose but she does not take that dose with food.  She will start taking the evening dose with food.  Just returned from vacation so is concerned her labs may not look as good as he would otherwise.  Has been several years since she had an eye exam.  Does wear reading glasses but denies vision concerns.  Checks blood sugar intermittently.  Would like prescription for refill on test strips for her glucometer.  Brand of glucometer is unknown.  That information would be with her's pharmacy.    For hyperlipidemia she takes simvastatin 10 mg at bedtime.  Denies side effects and requests refills.  She has had labs drawn this morning.  Those lab results are pending.    Well get occasional swelling of the left axilla with some burning stinging sensation.  The symptoms are brief and are much less frequent than when she was here last year.  She does get some intermittent left shoulder pain but denies neck pain.  Mammogram was done per gynecology in July and she had an ultrasound of the left axilla almost 1 year ago.  She denies any symptoms currently.  She will notify us of any recurrence of symptoms.  Also recommend we may need to evaluate her neck or left shoulder with radiology imaging to see if it could be a contributor to her symptoms.        Radha Swenson presents to [facility]          Objective   Vital Signs:   Vitals:    09/28/22 0806   BP: 121/81   BP Location: Left arm   Patient Position: Sitting   Cuff Size: Adult   Pulse: 83   SpO2: 96%   Weight: 75.8 kg (167 lb)   Height: 172.7 cm (68\")      Body mass index is 25.39 kg/m².  Physical Exam  Vitals reviewed.   Constitutional:       " General: She is not in acute distress.     Appearance: Normal appearance. She is well-developed.   Neck:      Thyroid: No thyroid mass, thyromegaly or thyroid tenderness.   Cardiovascular:      Rate and Rhythm: Normal rate and regular rhythm.      Pulses:           Dorsalis pedis pulses are 2+ on the right side and 2+ on the left side.      Heart sounds: Normal heart sounds.   Pulmonary:      Effort: Pulmonary effort is normal.      Breath sounds: Normal breath sounds.   Musculoskeletal:      Right lower leg: No edema.      Left lower leg: No edema.   Feet:      Right foot:      Protective Sensation: 3 sites tested. 3 sites sensed.      Skin integrity: Skin integrity normal. No ulcer or blister.      Toenail Condition: Right toenails are normal.      Left foot:      Protective Sensation: 3 sites tested. 3 sites sensed.      Skin integrity: Skin integrity normal. No ulcer or blister.      Toenail Condition: Left toenails are normal.      Comments:      Skin:     General: Skin is warm and dry.   Neurological:      General: No focal deficit present.      Mental Status: She is alert.   Psychiatric:         Attention and Perception: Attention normal.         Mood and Affect: Mood and affect normal.         Behavior: Behavior normal.          Result Review :                Assessment and Plan    Diagnoses and all orders for this visit:    1. Type 2 diabetes mellitus without complication, without long-term current use of insulin (HCC) (Primary)  Assessment & Plan:  Labs drawn this morning and results are pending.  Further treatment pending lab results.  Recommend an annual eye exam.    Orders:  -     glucose blood (ReliOn True Metrix Test Strips) test strip; Check glucose once daily  Dispense: 100 each; Refill: 1  -     metFORMIN ER (GLUCOPHAGE-XR) 500 MG 24 hr tablet; Take 1 tablet by mouth 2 (Two) Times a Day.  Dispense: 180 tablet; Refill: 1    2. Mixed hyperlipidemia  Assessment & Plan:  Further treatment pending lab  results.    Orders:  -     simvastatin (ZOCOR) 10 MG tablet; Take 1 tablet by mouth Daily.  Dispense: 90 tablet; Refill: 1      Follow Up    Return in about 6 months (around 3/28/2023).  Patient was given instructions and counseling regarding her condition or for health maintenance advice. Please see specific information pulled into the AVS if appropriate.

## 2022-09-29 LAB
25(OH)D3+25(OH)D2 SERPL-MCNC: 57.4 NG/ML (ref 30–100)
ALBUMIN SERPL-MCNC: 4.9 G/DL (ref 3.8–4.9)
ALBUMIN/CREAT UR: 6 MG/G CREAT (ref 0–29)
ALBUMIN/GLOB SERPL: 1.8 {RATIO} (ref 1.2–2.2)
ALP SERPL-CCNC: 81 IU/L (ref 44–121)
ALT SERPL-CCNC: 19 IU/L (ref 0–32)
AMBIG ABBREV BMP8 DEFAULT: NORMAL
AMBIG ABBREV LP DEFAULT: NORMAL
AST SERPL-CCNC: 19 IU/L (ref 0–40)
BILIRUB SERPL-MCNC: 0.5 MG/DL (ref 0–1.2)
BUN SERPL-MCNC: 14 MG/DL (ref 6–24)
BUN/CREAT SERPL: 14 (ref 9–23)
CALCIUM SERPL-MCNC: 10.2 MG/DL (ref 8.7–10.2)
CHLORIDE SERPL-SCNC: 98 MMOL/L (ref 96–106)
CHOLEST SERPL-MCNC: 178 MG/DL (ref 100–199)
CO2 SERPL-SCNC: 26 MMOL/L (ref 20–29)
CREAT SERPL-MCNC: 1.02 MG/DL (ref 0.57–1)
CREAT UR-MCNC: 128.8 MG/DL
EGFRCR SERPLBLD CKD-EPI 2021: 64 ML/MIN/1.73
GLOBULIN SER CALC-MCNC: 2.7 G/DL (ref 1.5–4.5)
GLUCOSE SERPL-MCNC: 101 MG/DL (ref 70–99)
HBA1C MFR BLD: 6.7 % (ref 4.8–5.6)
HDLC SERPL-MCNC: 47 MG/DL
LDLC SERPL CALC-MCNC: 103 MG/DL (ref 0–99)
MICROALBUMIN UR-MCNC: 7.7 UG/ML
POTASSIUM SERPL-SCNC: 4.2 MMOL/L (ref 3.5–5.2)
PROT SERPL-MCNC: 7.6 G/DL (ref 6–8.5)
SODIUM SERPL-SCNC: 141 MMOL/L (ref 134–144)
TRIGL SERPL-MCNC: 159 MG/DL (ref 0–149)
VLDLC SERPL CALC-MCNC: 28 MG/DL (ref 5–40)

## 2022-09-29 NOTE — PROGRESS NOTES
Blood sugar is upper limits of normal.  Kidney function is stable.  Liver function is normal.  Lipid panel looks good overall.  Mild elevation of triglycerides is likely related to the mild elevation of blood sugar.  Hemoglobin A1c is stable.  Overall labs look very good.  MND level is normal.

## 2023-04-19 ENCOUNTER — OFFICE VISIT (OUTPATIENT)
Dept: FAMILY MEDICINE CLINIC | Age: 58
End: 2023-04-19
Payer: COMMERCIAL

## 2023-04-19 VITALS
OXYGEN SATURATION: 99 % | BODY MASS INDEX: 25.31 KG/M2 | DIASTOLIC BLOOD PRESSURE: 74 MMHG | HEART RATE: 76 BPM | WEIGHT: 167 LBS | SYSTOLIC BLOOD PRESSURE: 108 MMHG | HEIGHT: 68 IN

## 2023-04-19 DIAGNOSIS — E78.2 MIXED HYPERLIPIDEMIA: ICD-10-CM

## 2023-04-19 DIAGNOSIS — E11.9 TYPE 2 DIABETES MELLITUS WITHOUT COMPLICATION, WITHOUT LONG-TERM CURRENT USE OF INSULIN: Primary | ICD-10-CM

## 2023-04-19 DIAGNOSIS — R59.0 LYMPHADENOPATHY, AXILLARY: ICD-10-CM

## 2023-04-19 RX ORDER — SIMVASTATIN 10 MG
10 TABLET ORAL DAILY
Qty: 90 TABLET | Refills: 1 | Status: SHIPPED | OUTPATIENT
Start: 2023-04-19

## 2023-04-19 RX ORDER — LORATADINE 10 MG/1
10 TABLET ORAL DAILY
COMMUNITY

## 2023-04-19 RX ORDER — METFORMIN HYDROCHLORIDE 500 MG/1
500 TABLET, EXTENDED RELEASE ORAL 2 TIMES DAILY
Qty: 180 TABLET | Refills: 1 | Status: SHIPPED | OUTPATIENT
Start: 2023-04-19

## 2023-04-19 NOTE — PROGRESS NOTES
"Chief Complaint  Diabetes (6 month follow up - med refills) and Hyperlipidemia    Subjective          Radha Swenson presents to [facility]     Patient is a 57-year-old female who is here today to follow-up regarding type 2 diabetes.  Currently taking metformin extended release 500 mg twice daily.  Is having less stomach upset now that she is taking evening dose with a meal.  Last hemoglobin A1c in September was 6.7.  Has not had a recent eye exam.  Benefits of eye exams for diabetics are explained to patient.  Eye exam encouraged.  Monitors blood sugar at home and sometimes fasting blood sugars are 97 range approximate.  She feels as if her diabetes is under good control.  She has a strong family history of diabetes.    For hyperlipidemia she takes simvastatin 10 mg daily.  Denies side effects and requests refills.    Regarding previous intermittent axillary lymphadenopathy on the left, symptoms are improved and not current.  She watched a commercial about HS and feels as if it was a perfect description of intermittent episodes.  She is following skin care recommendations forHS (hidradenitis suppurativa).     Objective   Vital Signs:   Vitals:    04/19/23 0752   BP: 108/74   BP Location: Left arm   Patient Position: Sitting   Cuff Size: Large Adult   Pulse: 76   SpO2: 99%   Weight: 75.8 kg (167 lb)   Height: 172.7 cm (68\")      Body mass index is 25.39 kg/m².  Physical Exam  Vitals reviewed.   Constitutional:       General: She is not in acute distress.     Appearance: Normal appearance. She is well-developed.   Cardiovascular:      Rate and Rhythm: Normal rate and regular rhythm.      Pulses:           Dorsalis pedis pulses are 2+ on the right side and 2+ on the left side.      Heart sounds: Normal heart sounds.   Pulmonary:      Effort: Pulmonary effort is normal.      Breath sounds: Normal breath sounds.   Musculoskeletal:      Right lower leg: No edema.      Left lower leg: " No edema.   Feet:      Right foot:      Protective Sensation: 3 sites tested. 3 sites sensed.      Skin integrity: Skin integrity normal. No ulcer or blister.      Toenail Condition: Right toenails are normal.      Left foot:      Protective Sensation: 3 sites tested. 3 sites sensed.      Skin integrity: Skin integrity normal. No ulcer or blister.      Toenail Condition: Left toenails are normal.      Comments:      Skin:     General: Skin is warm and dry.   Neurological:      General: No focal deficit present.      Mental Status: She is alert.   Psychiatric:         Attention and Perception: Attention normal.         Mood and Affect: Mood and affect normal.         Behavior: Behavior normal.           Current Outpatient Medications:   •  CALCIUM PO, Take  by mouth., Disp: , Rfl:   •  glucose blood (ReliOn True Metrix Test Strips) test strip, Check glucose once daily, Disp: 100 each, Rfl: 1  •  loratadine (CLARITIN) 10 MG tablet, Take 1 tablet by mouth Daily., Disp: , Rfl:   •  metFORMIN ER (GLUCOPHAGE-XR) 500 MG 24 hr tablet, Take 1 tablet by mouth 2 (Two) Times a Day., Disp: 180 tablet, Rfl: 1  •  simvastatin (ZOCOR) 10 MG tablet, Take 1 tablet by mouth Daily., Disp: 90 tablet, Rfl: 1  •  VITAMIN D PO, Take  by mouth., Disp: , Rfl:    Past Medical History:   Diagnosis Date   • Atopic dermatitis, unspecified    • Crohn disease     DX'D AT AGE 20s   • GERD with esophagitis    • Mixed hyperlipidemia    • Overweight    • Prediabetes    • Rash and other nonspecific skin eruption    • Unspecified asthma, uncomplicated    • Vitamin D deficiency, unspecified          Result Review :     CMP        9/28/2022    07:52   CMP   Glucose 101     BUN 14     Creatinine 1.02     Sodium 141     Potassium 4.2     Chloride 98     Calcium 10.2     Total Protein 7.6     Albumin 4.9     Globulin 2.7     Total Bilirubin 0.5     Alkaline Phosphatase 81     AST (SGOT) 19     ALT (SGPT) 19     BUN/Creatinine Ratio 14             Lipid Panel         9/28/2022    07:52   Lipid Panel   Total Cholesterol 178     Triglycerides 159     HDL Cholesterol 47     VLDL Cholesterol 28     LDL Cholesterol  103                    Assessment and Plan    Diagnoses and all orders for this visit:    1. Type 2 diabetes mellitus without complication, without long-term current use of insulin (Primary)  Assessment & Plan:  Patient is taking printed lab orders and will have collected at [facility] next week.  Further treatment pending lab results.    Orders:  -     Comprehensive metabolic panel; Future  -     Hemoglobin A1c; Future  -     Lipid panel; Future  -     metFORMIN ER (GLUCOPHAGE-XR) 500 MG 24 hr tablet; Take 1 tablet by mouth 2 (Two) Times a Day.  Dispense: 180 tablet; Refill: 1    2. Mixed hyperlipidemia  -     Lipid panel; Future  -     simvastatin (ZOCOR) 10 MG tablet; Take 1 tablet by mouth Daily.  Dispense: 90 tablet; Refill: 1    3. Lymphadenopathy, axillary  Assessment & Plan:  Currently resolved.  Follow-up for any persisting concerns.        Follow Up    No follow-ups on file.  Patient was given instructions and counseling regarding her condition or for health maintenance advice. Please see specific information pulled into the AVS if appropriate.

## 2023-04-19 NOTE — ASSESSMENT & PLAN NOTE
Patient is taking printed lab orders and will have collected at [facility] next week.  Further treatment pending lab results.

## 2023-04-27 LAB
ALBUMIN SERPL-MCNC: 4.6 G/DL (ref 3.8–4.9)
ALBUMIN/GLOB SERPL: 1.8 {RATIO} (ref 1.2–2.2)
ALP SERPL-CCNC: 82 IU/L (ref 44–121)
ALT SERPL-CCNC: 14 IU/L (ref 0–32)
AMBIG ABBREV CMP14 DEFAULT: NORMAL
AMBIG ABBREV LP DEFAULT: NORMAL
AST SERPL-CCNC: 19 IU/L (ref 0–40)
BILIRUB SERPL-MCNC: 0.5 MG/DL (ref 0–1.2)
BUN SERPL-MCNC: 21 MG/DL (ref 6–24)
BUN/CREAT SERPL: 22 (ref 9–23)
CALCIUM SERPL-MCNC: 10 MG/DL (ref 8.7–10.2)
CHLORIDE SERPL-SCNC: 103 MMOL/L (ref 96–106)
CHOLEST SERPL-MCNC: 172 MG/DL (ref 100–199)
CO2 SERPL-SCNC: 25 MMOL/L (ref 20–29)
CREAT SERPL-MCNC: 0.97 MG/DL (ref 0.57–1)
EGFRCR SERPLBLD CKD-EPI 2021: 68 ML/MIN/1.73
GLOBULIN SER CALC-MCNC: 2.5 G/DL (ref 1.5–4.5)
GLUCOSE SERPL-MCNC: 100 MG/DL (ref 70–99)
HBA1C MFR BLD: 6.5 % (ref 4.8–5.6)
HDLC SERPL-MCNC: 52 MG/DL
LDLC SERPL CALC-MCNC: 97 MG/DL (ref 0–99)
POTASSIUM SERPL-SCNC: 4.6 MMOL/L (ref 3.5–5.2)
PROT SERPL-MCNC: 7.1 G/DL (ref 6–8.5)
SODIUM SERPL-SCNC: 143 MMOL/L (ref 134–144)
TRIGL SERPL-MCNC: 133 MG/DL (ref 0–149)
VLDLC SERPL CALC-MCNC: 23 MG/DL (ref 5–40)

## 2023-04-30 NOTE — PROGRESS NOTES
Diabetes is under good control.  Kidney and liver function are normal.  Lipid panel  is controlled on simvastatin.

## 2023-10-19 DIAGNOSIS — E78.2 MIXED HYPERLIPIDEMIA: ICD-10-CM

## 2023-10-19 RX ORDER — SIMVASTATIN 10 MG
10 TABLET ORAL DAILY
Qty: 90 TABLET | Refills: 1 | Status: SHIPPED | OUTPATIENT
Start: 2023-10-19

## 2023-10-27 ENCOUNTER — OFFICE VISIT (OUTPATIENT)
Dept: FAMILY MEDICINE CLINIC | Age: 58
End: 2023-10-27
Payer: COMMERCIAL

## 2023-10-27 VITALS
WEIGHT: 168.6 LBS | HEIGHT: 68 IN | OXYGEN SATURATION: 98 % | HEART RATE: 64 BPM | DIASTOLIC BLOOD PRESSURE: 76 MMHG | BODY MASS INDEX: 25.55 KG/M2 | SYSTOLIC BLOOD PRESSURE: 110 MMHG

## 2023-10-27 DIAGNOSIS — E78.2 MIXED HYPERLIPIDEMIA: ICD-10-CM

## 2023-10-27 DIAGNOSIS — Z12.11 COLON CANCER SCREENING: ICD-10-CM

## 2023-10-27 DIAGNOSIS — F41.9 ANXIETY: ICD-10-CM

## 2023-10-27 DIAGNOSIS — E11.9 TYPE 2 DIABETES MELLITUS WITHOUT COMPLICATION, WITHOUT LONG-TERM CURRENT USE OF INSULIN: Primary | ICD-10-CM

## 2023-10-27 RX ORDER — SIMVASTATIN 10 MG
10 TABLET ORAL DAILY
Qty: 90 TABLET | Refills: 1 | Status: SHIPPED | OUTPATIENT
Start: 2023-10-27

## 2023-10-27 RX ORDER — CETIRIZINE HYDROCHLORIDE 5 MG/1
5 TABLET ORAL DAILY
COMMUNITY

## 2023-10-27 RX ORDER — METFORMIN HYDROCHLORIDE 500 MG/1
500 TABLET, EXTENDED RELEASE ORAL 2 TIMES DAILY
Qty: 180 TABLET | Refills: 1 | Status: SHIPPED | OUTPATIENT
Start: 2023-10-27

## 2023-10-27 RX ORDER — HYDROXYZINE HYDROCHLORIDE 25 MG/1
25 TABLET, FILM COATED ORAL 3 TIMES DAILY PRN
Qty: 30 TABLET | Refills: 3 | Status: SHIPPED | OUTPATIENT
Start: 2023-10-27

## 2023-10-27 NOTE — ASSESSMENT & PLAN NOTE
Printed orders are provided to patient to take to [facility] for completion.  Patient states she will have labs done next week.  Further treatment recommendations pending lab results.

## 2023-10-27 NOTE — PROGRESS NOTES
"Chief Complaint  Type 2 diabetes mellitus without complication, without long (6 month follow up ) and Hyperlipidemia    Subjective          Radha Swenson presents to [facility]     Patient is a 58-year-old female who is here today to follow-up regarding type 2 diabetes.  Currently taking metformin extended release 500 mg twice per day.  Last hemoglobin A1c was 6.5%.  Denies medication side effects and requests refills.  Has been more than 1 year since she has had an eye exam.  Encourage dilated eye exam.    For hyperlipidemia she takes simvastatin 10 mg at bedtime.  Denies side effects and requests refills.    Has had multiple colonoscopies dating back to her 20s when she was ruling out Crohn's disease.  Denies any current gastrointestinal symptoms.  Had stool for occult blood negative a year ago.  Family history is negative for colon cancer or colon polyps.  She has consider getting Cologuard for colon cancer screening.    Some increased anxiety due to caring for elderly mother who has recently moved in with her.  Is not able to perform yoga as often for stress relief.  Denies depression.  Anxiety sometimes interrupts sleep.  Her sister has recommended hydroxyzine.     Objective   Vital Signs:   Vitals:    10/27/23 0757   BP: 110/76   BP Location: Right arm   Patient Position: Sitting   Cuff Size: Adult   Pulse: 64   SpO2: 98%  Comment: room air   Weight: 76.5 kg (168 lb 9.6 oz)   Height: 172.7 cm (68\")       Wt Readings from Last 3 Encounters:   10/27/23 76.5 kg (168 lb 9.6 oz)   04/19/23 75.8 kg (167 lb)   09/28/22 75.8 kg (167 lb)      BP Readings from Last 3 Encounters:   10/27/23 110/76   04/19/23 108/74   09/28/22 121/81       Body mass index is 25.64 kg/m².           Physical Exam  Vitals reviewed.   Constitutional:       General: She is not in acute distress.     Appearance: Normal appearance. She is well-developed.   HENT:      Right Ear: Tympanic membrane normal.     "  Left Ear: Tympanic membrane normal.      Mouth/Throat:      Pharynx: Oropharynx is clear. No oropharyngeal exudate or posterior oropharyngeal erythema.   Neck:      Thyroid: No thyromegaly.   Cardiovascular:      Rate and Rhythm: Normal rate and regular rhythm.      Pulses:           Dorsalis pedis pulses are 2+ on the right side and 2+ on the left side.      Heart sounds: Normal heart sounds.   Pulmonary:      Effort: Pulmonary effort is normal.      Breath sounds: Normal breath sounds.   Musculoskeletal:      Right lower leg: No edema.      Left lower leg: No edema.   Feet:      Right foot:      Protective Sensation: 3 sites tested.  3 sites sensed.      Skin integrity: Skin integrity normal. No ulcer or blister.      Toenail Condition: Right toenails are normal.      Left foot:      Protective Sensation: 3 sites tested.  3 sites sensed.      Skin integrity: Skin integrity normal. No ulcer or blister.      Toenail Condition: Left toenails are normal.      Comments:      Skin:     General: Skin is warm and dry.   Neurological:      General: No focal deficit present.      Mental Status: She is alert.   Psychiatric:         Attention and Perception: Attention normal.         Mood and Affect: Mood and affect normal.         Behavior: Behavior normal.           Current Outpatient Medications:     CALCIUM PO, Take  by mouth., Disp: , Rfl:     cetirizine (zyrTEC) 5 MG tablet, Take 1 tablet by mouth Daily., Disp: , Rfl:     glucose blood (ReliOn True Metrix Test Strips) test strip, Check glucose once daily, Disp: 100 each, Rfl: 1    metFORMIN ER (GLUCOPHAGE-XR) 500 MG 24 hr tablet, Take 1 tablet by mouth 2 (Two) Times a Day., Disp: 180 tablet, Rfl: 1    simvastatin (ZOCOR) 10 MG tablet, Take 1 tablet by mouth Daily., Disp: 90 tablet, Rfl: 1    VITAMIN D PO, Take  by mouth., Disp: , Rfl:     hydrOXYzine (ATARAX) 25 MG tablet, Take 1 tablet by mouth 3 (Three) Times a Day As Needed for Itching, Allergies or Anxiety., Disp:  30 tablet, Rfl: 3   Past Medical History:   Diagnosis Date    Atopic dermatitis, unspecified     Crohn disease     DX'D AT AGE 20s    GERD with esophagitis     Mixed hyperlipidemia     Overweight     Prediabetes     Rash and other nonspecific skin eruption     Unspecified asthma, uncomplicated     Vitamin D deficiency, unspecified      Allergies   Allergen Reactions    Codeine Nausea Only               Result Review :     Common labs          4/26/2023    09:10   Common Labs   Glucose 100    BUN 21    Creatinine 0.97    Sodium 143    Potassium 4.6    Chloride 103    Calcium 10.0    Total Protein 7.1    Albumin 4.6    Total Bilirubin 0.5    Alkaline Phosphatase 82    AST (SGOT) 19    ALT (SGPT) 14    Total Cholesterol 172    Triglycerides 133    HDL Cholesterol 52    LDL Cholesterol  97    Hemoglobin A1C 6.5         No Images in the past 120 days found..           Social History     Tobacco Use   Smoking Status Never   Smokeless Tobacco Never           Assessment and Plan    Diagnoses and all orders for this visit:    1. Type 2 diabetes mellitus without complication, without long-term current use of insulin (Primary)  Assessment & Plan:  Printed orders are provided to patient to take to [facility] for completion.  Patient states she will have labs done next week.  Further treatment recommendations pending lab results.    Orders:  -     Comprehensive metabolic panel; Future  -     Hemoglobin A1c; Future  -     Lipid panel; Future  -     Microalbumin / Creatinine Urine Ratio - Urine, Clean Catch; Future  -     metFORMIN ER (GLUCOPHAGE-XR) 500 MG 24 hr tablet; Take 1 tablet by mouth 2 (Two) Times a Day.  Dispense: 180 tablet; Refill: 1    2. Mixed hyperlipidemia  -     simvastatin (ZOCOR) 10 MG tablet; Take 1 tablet by mouth Daily.  Dispense: 90 tablet; Refill: 1    3. Anxiety  Assessment & Plan:  Hydroxyzine may cause drowsiness.  Follow-up if symptoms or not controlled.    Orders:  -     hydrOXYzine (ATARAX) 25 MG tablet;  Take 1 tablet by mouth 3 (Three) Times a Day As Needed for Itching, Allergies or Anxiety.  Dispense: 30 tablet; Refill: 3    4. Colon cancer screening  -     Cologuard - Stool, Per Rectum; Future        Follow Up    Return in about 6 months (around 4/27/2024).  Patient was given instructions and counseling regarding her condition or for health maintenance advice. Please see specific information pulled into the AVS if appropriate.

## 2023-11-04 LAB
ALBUMIN SERPL-MCNC: 4.2 G/DL (ref 3.8–4.9)
ALBUMIN/CREAT UR: 6 MG/G CREAT (ref 0–29)
ALBUMIN/GLOB SERPL: 1.7 {RATIO} (ref 1.2–2.2)
ALP SERPL-CCNC: 73 IU/L (ref 44–121)
ALT SERPL-CCNC: 13 IU/L (ref 0–32)
AMBIG ABBREV CMP14 DEFAULT: NORMAL
AMBIG ABBREV LP DEFAULT: NORMAL
AST SERPL-CCNC: 16 IU/L (ref 0–40)
BILIRUB SERPL-MCNC: 0.4 MG/DL (ref 0–1.2)
BUN SERPL-MCNC: 17 MG/DL (ref 6–24)
BUN/CREAT SERPL: 18 (ref 9–23)
CALCIUM SERPL-MCNC: 9.8 MG/DL (ref 8.7–10.2)
CHLORIDE SERPL-SCNC: 103 MMOL/L (ref 96–106)
CHOLEST SERPL-MCNC: 156 MG/DL (ref 100–199)
CO2 SERPL-SCNC: 27 MMOL/L (ref 20–29)
CREAT SERPL-MCNC: 0.93 MG/DL (ref 0.57–1)
CREAT UR-MCNC: 114.3 MG/DL
EGFRCR SERPLBLD CKD-EPI 2021: 71 ML/MIN/1.73
GLOBULIN SER CALC-MCNC: 2.5 G/DL (ref 1.5–4.5)
GLUCOSE SERPL-MCNC: 108 MG/DL (ref 70–99)
HBA1C MFR BLD: 6.8 % (ref 4.8–5.6)
HDLC SERPL-MCNC: 44 MG/DL
LDLC SERPL CALC-MCNC: 90 MG/DL (ref 0–99)
MICROALBUMIN UR-MCNC: 6.8 UG/ML
POTASSIUM SERPL-SCNC: 4.5 MMOL/L (ref 3.5–5.2)
PROT SERPL-MCNC: 6.7 G/DL (ref 6–8.5)
SODIUM SERPL-SCNC: 143 MMOL/L (ref 134–144)
TRIGL SERPL-MCNC: 123 MG/DL (ref 0–149)
VLDLC SERPL CALC-MCNC: 22 MG/DL (ref 5–40)

## 2024-02-06 RX ORDER — FLUTICASONE PROPIONATE AND SALMETEROL 50; 250 UG/1; UG/1
1 POWDER RESPIRATORY (INHALATION) 2 TIMES DAILY
Qty: 60 EACH | Refills: 2 | Status: SHIPPED | OUTPATIENT
Start: 2024-02-06

## 2024-05-24 ENCOUNTER — TRANSCRIBE ORDERS (OUTPATIENT)
Dept: FAMILY MEDICINE CLINIC | Age: 59
End: 2024-05-24

## 2024-05-24 ENCOUNTER — OFFICE VISIT (OUTPATIENT)
Dept: FAMILY MEDICINE CLINIC | Age: 59
End: 2024-05-24
Payer: COMMERCIAL

## 2024-05-24 VITALS
HEART RATE: 79 BPM | DIASTOLIC BLOOD PRESSURE: 77 MMHG | BODY MASS INDEX: 26.22 KG/M2 | WEIGHT: 173 LBS | SYSTOLIC BLOOD PRESSURE: 112 MMHG | OXYGEN SATURATION: 95 % | HEIGHT: 68 IN

## 2024-05-24 DIAGNOSIS — L81.9 HYPERPIGMENTATION OF SKIN: ICD-10-CM

## 2024-05-24 DIAGNOSIS — Z13.0 SCREENING FOR DEFICIENCY ANEMIA: ICD-10-CM

## 2024-05-24 DIAGNOSIS — Z13.29 THYROID DISORDER SCREEN: ICD-10-CM

## 2024-05-24 DIAGNOSIS — E11.9 TYPE 2 DIABETES MELLITUS WITHOUT COMPLICATION, WITHOUT LONG-TERM CURRENT USE OF INSULIN: Primary | ICD-10-CM

## 2024-05-24 DIAGNOSIS — M79.601 RIGHT ARM PAIN: Primary | ICD-10-CM

## 2024-05-24 DIAGNOSIS — E78.2 MIXED HYPERLIPIDEMIA: ICD-10-CM

## 2024-05-24 DIAGNOSIS — F41.9 ANXIETY: ICD-10-CM

## 2024-05-24 DIAGNOSIS — M79.601 RIGHT ARM PAIN: ICD-10-CM

## 2024-05-24 PROCEDURE — 99214 OFFICE O/P EST MOD 30 MIN: CPT | Performed by: NURSE PRACTITIONER

## 2024-05-24 RX ORDER — METFORMIN HYDROCHLORIDE 500 MG/1
500 TABLET, EXTENDED RELEASE ORAL 2 TIMES DAILY
Qty: 180 TABLET | Refills: 1 | Status: SHIPPED | OUTPATIENT
Start: 2024-05-24

## 2024-05-24 RX ORDER — SIMVASTATIN 10 MG
10 TABLET ORAL DAILY
Qty: 90 TABLET | Refills: 1 | Status: SHIPPED | OUTPATIENT
Start: 2024-05-24

## 2024-05-24 RX ORDER — BUSPIRONE HYDROCHLORIDE 5 MG/1
5 TABLET ORAL 2 TIMES DAILY PRN
Qty: 60 TABLET | Refills: 5 | Status: SHIPPED | OUTPATIENT
Start: 2024-05-24

## 2024-05-24 RX ORDER — HYDROXYZINE HYDROCHLORIDE 25 MG/1
25 TABLET, FILM COATED ORAL 3 TIMES DAILY PRN
Qty: 90 TABLET | Refills: 1 | Status: SHIPPED | OUTPATIENT
Start: 2024-05-24

## 2024-05-24 NOTE — PROGRESS NOTES
"Chief Complaint  Diabetes (6 month follow up ), Hyperlipidemia, Anxiety, and Annual Exam (Physical )    Subjective          Radha Swenson presents to [facility]     Patient is a 58-year-old female who is here today to follow-up regarding type 2 diabetes.  She takes metformin extended release 500 mg twice per day.  Last hemoglobin A1c was 6.8 in November.    Last Pap done June 2023.  Cologuard negative.    For high cholesterol she takes simvastatin 10 mg daily.  Denies side effects and requests refills.    Patient reports right venous pain in the bend of the arm.  Has had some intermittent weakness of this elbow but no joint pain and she has full range of motion.    Patient would like area checked on her back.    Patient reports bulging varicosities that are stable and painless.    Hydroxyzine is taken a few times per month to help with sleep.  Occasionally does not help much but feels as if she be too groggy the next day if she took 2 tablets of hydroxyzine.  Does not want to take anything in the added depressive category.  She denies depression.  She would like something else to try for anxiety.     Objective   Vital Signs:   Vitals:    05/24/24 0806   BP: 112/77   BP Location: Left arm   Patient Position: Sitting   Cuff Size: Large Adult   Pulse: 79   SpO2: 95%   Weight: 78.5 kg (173 lb)   Height: 172.7 cm (68\")       Wt Readings from Last 3 Encounters:   05/24/24 78.5 kg (173 lb)   10/27/23 76.5 kg (168 lb 9.6 oz)   04/19/23 75.8 kg (167 lb)      BP Readings from Last 3 Encounters:   05/24/24 112/77   10/27/23 110/76   04/19/23 108/74       Body mass index is 26.3 kg/m².             Physical Exam  Vitals reviewed.   Constitutional:       General: She is not in acute distress.     Appearance: Normal appearance. She is well-developed.   Neck:      Thyroid: No thyromegaly.      Vascular: No carotid bruit.   Cardiovascular:      Rate and Rhythm: Normal rate and regular rhythm. "      Pulses:           Posterior tibial pulses are 2+ on the right side and 2+ on the left side.      Heart sounds: Normal heart sounds.   Pulmonary:      Effort: Pulmonary effort is normal.      Breath sounds: Normal breath sounds.   Chest:       Musculoskeletal:         General: Normal range of motion.        Arms:       Right lower leg: No edema.      Left lower leg: No edema.   Skin:     General: Skin is warm and dry.   Neurological:      General: No focal deficit present.      Mental Status: She is alert.   Psychiatric:         Attention and Perception: Attention normal.         Mood and Affect: Mood and affect normal.         Behavior: Behavior normal.           Current Outpatient Medications:     CALCIUM PO, Take  by mouth., Disp: , Rfl:     cetirizine (zyrTEC) 5 MG tablet, Take 1 tablet by mouth Daily., Disp: , Rfl:     Fluticasone-Salmeterol (Advair Diskus) 250-50 MCG/ACT DISKUS, INHALE 1 PUFF TWICE DAILY, Disp: 60 each, Rfl: 2    glucose blood (ReliOn True Metrix Test Strips) test strip, Check glucose once daily, Disp: 100 each, Rfl: 1    hydrOXYzine (ATARAX) 25 MG tablet, Take 1 tablet by mouth 3 (Three) Times a Day As Needed for Itching, Allergies or Anxiety., Disp: 90 tablet, Rfl: 1    metFORMIN ER (GLUCOPHAGE-XR) 500 MG 24 hr tablet, Take 1 tablet by mouth 2 (Two) Times a Day., Disp: 180 tablet, Rfl: 1    simvastatin (ZOCOR) 10 MG tablet, Take 1 tablet by mouth Daily., Disp: 90 tablet, Rfl: 1    VITAMIN D PO, Take  by mouth., Disp: , Rfl:     busPIRone (BUSPAR) 5 MG tablet, Take 1 tablet by mouth 2 (Two) Times a Day As Needed (anxiety)., Disp: 60 tablet, Rfl: 5   Past Medical History:   Diagnosis Date    Atopic dermatitis, unspecified     Crohn disease     DX'D AT AGE 20s    GERD with esophagitis     Mixed hyperlipidemia     Overweight     Prediabetes     Rash and other nonspecific skin eruption     Unspecified asthma, uncomplicated     Vitamin D deficiency, unspecified      Allergies   Allergen  Reactions    Codeine Nausea Only               Result Review :     Common labs          11/3/2023    07:37   Common Labs   Glucose 108    BUN 17    Creatinine 0.93    Sodium 143    Potassium 4.5    Chloride 103    Calcium 9.8    Total Protein 6.7    Albumin 4.2    Total Bilirubin 0.4    Alkaline Phosphatase 73    AST (SGOT) 16    ALT (SGPT) 13    Total Cholesterol 156    Triglycerides 123    HDL Cholesterol 44    LDL Cholesterol  90    Hemoglobin A1C 6.8    Microalbumin, Urine 6.8         No Images in the past 120 days found..           Social History     Tobacco Use   Smoking Status Never   Smokeless Tobacco Never           Assessment and Plan    Diagnoses and all orders for this visit:    1. Type 2 diabetes mellitus without complication, without long-term current use of insulin (Primary)  Assessment & Plan:  Monitor blood sugar at home and advise annual eye exam.  Follow-up in 6 months or sooner if concerns    Orders:  -     Comprehensive metabolic panel; Future  -     Hemoglobin A1c; Future  -     Lipid panel; Future  -     metFORMIN ER (GLUCOPHAGE-XR) 500 MG 24 hr tablet; Take 1 tablet by mouth 2 (Two) Times a Day.  Dispense: 180 tablet; Refill: 1    2. Mixed hyperlipidemia  -     simvastatin (ZOCOR) 10 MG tablet; Take 1 tablet by mouth Daily.  Dispense: 90 tablet; Refill: 1    3. Anxiety  -     hydrOXYzine (ATARAX) 25 MG tablet; Take 1 tablet by mouth 3 (Three) Times a Day As Needed for Itching, Allergies or Anxiety.  Dispense: 90 tablet; Refill: 1  -     busPIRone (BUSPAR) 5 MG tablet; Take 1 tablet by mouth 2 (Two) Times a Day As Needed (anxiety).  Dispense: 60 tablet; Refill: 5    4. Right arm pain  Assessment & Plan:  Venous Doppler ultrasound done and negative.  Consider x-ray imaging if not improving.      5. Thyroid disorder screen  -     TSH Rfx On Abnormal To Free T4; Future    6. Screening for deficiency anemia  -     CBC w AUTO Differential; Future    7. Hyperpigmentation of skin  Assessment &  Plan:  Not consistent with acanthosis nigra cans.  Monitor closely.  Could be postinflammatory.  Denies joint pain or rashes.            Follow Up    No follow-ups on file.  Patient was given instructions and counseling regarding her condition or for health maintenance advice. Please see specific information pulled into the AVS if appropriate.

## 2024-05-28 DIAGNOSIS — M79.601 RIGHT ARM PAIN: ICD-10-CM

## 2024-05-29 NOTE — ASSESSMENT & PLAN NOTE
Monitor blood sugar at home and advise annual eye exam.  Follow-up in 6 months or sooner if concerns

## 2024-05-29 NOTE — ASSESSMENT & PLAN NOTE
Not consistent with acanthosis nigra cans.  Monitor closely.  Could be postinflammatory.  Denies joint pain or rashes.

## 2024-05-30 LAB
ALBUMIN SERPL-MCNC: 4.2 G/DL (ref 3.8–4.9)
ALBUMIN/GLOB SERPL: 1.6 {RATIO} (ref 1.2–2.2)
ALP SERPL-CCNC: 81 IU/L (ref 44–121)
ALT SERPL-CCNC: 14 IU/L (ref 0–32)
AMBIG ABBREV CMP14 DEFAULT: NORMAL
AMBIG ABBREV LP DEFAULT: NORMAL
AST SERPL-CCNC: 14 IU/L (ref 0–40)
BASOPHILS # BLD AUTO: 0.1 X10E3/UL (ref 0–0.2)
BASOPHILS NFR BLD AUTO: 2 %
BILIRUB SERPL-MCNC: 0.3 MG/DL (ref 0–1.2)
BUN SERPL-MCNC: 16 MG/DL (ref 6–24)
BUN/CREAT SERPL: 15 (ref 9–23)
CALCIUM SERPL-MCNC: 10.1 MG/DL (ref 8.7–10.2)
CHLORIDE SERPL-SCNC: 102 MMOL/L (ref 96–106)
CHOLEST SERPL-MCNC: 169 MG/DL (ref 100–199)
CO2 SERPL-SCNC: 24 MMOL/L (ref 20–29)
CREAT SERPL-MCNC: 1.08 MG/DL (ref 0.57–1)
EGFRCR SERPLBLD CKD-EPI 2021: 59 ML/MIN/1.73
EOSINOPHIL # BLD AUTO: 0.5 X10E3/UL (ref 0–0.4)
EOSINOPHIL NFR BLD AUTO: 9 %
ERYTHROCYTE [DISTWIDTH] IN BLOOD BY AUTOMATED COUNT: 13.5 % (ref 11.7–15.4)
GLOBULIN SER CALC-MCNC: 2.7 G/DL (ref 1.5–4.5)
GLUCOSE SERPL-MCNC: 115 MG/DL (ref 70–99)
HBA1C MFR BLD: 6.9 % (ref 4.8–5.6)
HCT VFR BLD AUTO: 38.5 % (ref 34–46.6)
HDLC SERPL-MCNC: 45 MG/DL
HGB BLD-MCNC: 12.5 G/DL (ref 11.1–15.9)
IMM GRANULOCYTES # BLD AUTO: 0 X10E3/UL (ref 0–0.1)
IMM GRANULOCYTES NFR BLD AUTO: 0 %
LDLC SERPL CALC-MCNC: 96 MG/DL (ref 0–99)
LYMPHOCYTES # BLD AUTO: 1.5 X10E3/UL (ref 0.7–3.1)
LYMPHOCYTES NFR BLD AUTO: 26 %
MCH RBC QN AUTO: 28.2 PG (ref 26.6–33)
MCHC RBC AUTO-ENTMCNC: 32.5 G/DL (ref 31.5–35.7)
MCV RBC AUTO: 87 FL (ref 79–97)
MONOCYTES # BLD AUTO: 0.4 X10E3/UL (ref 0.1–0.9)
MONOCYTES NFR BLD AUTO: 6 %
NEUTROPHILS # BLD AUTO: 3.3 X10E3/UL (ref 1.4–7)
NEUTROPHILS NFR BLD AUTO: 57 %
PLATELET # BLD AUTO: 293 X10E3/UL (ref 150–450)
POTASSIUM SERPL-SCNC: 4.4 MMOL/L (ref 3.5–5.2)
PROT SERPL-MCNC: 6.9 G/DL (ref 6–8.5)
RBC # BLD AUTO: 4.43 X10E6/UL (ref 3.77–5.28)
SODIUM SERPL-SCNC: 141 MMOL/L (ref 134–144)
TRIGL SERPL-MCNC: 159 MG/DL (ref 0–149)
TSH SERPL DL<=0.005 MIU/L-ACNC: 4.11 UIU/ML (ref 0.45–4.5)
VLDLC SERPL CALC-MCNC: 28 MG/DL (ref 5–40)
WBC # BLD AUTO: 5.8 X10E3/UL (ref 3.4–10.8)

## 2024-05-30 NOTE — PROGRESS NOTES
You are not anemic.  Diabetes is under good control.  I do not recommend any changes to treatment for cholesterol at this time.  Liver and thyroid function are normal.  BUN, creatinine, and EGFR tell us about kidney function.  Kidney function is slightly diminished.  Most often this could be due to not drinking enough fluids.  It is not unreasonable to recheck your kidney function in 1 month.  Let me know if you would like for me to order repeat kidney function in 1 month.  Increase fluid intake.

## 2024-07-02 ENCOUNTER — TELEPHONE (OUTPATIENT)
Dept: FAMILY MEDICINE CLINIC | Age: 59
End: 2024-07-02
Payer: COMMERCIAL

## 2024-11-29 DIAGNOSIS — E11.9 TYPE 2 DIABETES MELLITUS WITHOUT COMPLICATION, WITHOUT LONG-TERM CURRENT USE OF INSULIN: ICD-10-CM

## 2024-12-02 RX ORDER — METFORMIN HYDROCHLORIDE 500 MG/1
500 TABLET, EXTENDED RELEASE ORAL 2 TIMES DAILY
Qty: 180 TABLET | Refills: 1 | Status: SHIPPED | OUTPATIENT
Start: 2024-12-02 | End: 2024-12-06 | Stop reason: SDUPTHER

## 2024-12-06 ENCOUNTER — OFFICE VISIT (OUTPATIENT)
Dept: FAMILY MEDICINE CLINIC | Age: 59
End: 2024-12-06
Payer: COMMERCIAL

## 2024-12-06 VITALS
HEART RATE: 82 BPM | WEIGHT: 170 LBS | OXYGEN SATURATION: 97 % | TEMPERATURE: 97.4 F | DIASTOLIC BLOOD PRESSURE: 70 MMHG | SYSTOLIC BLOOD PRESSURE: 113 MMHG | HEIGHT: 68 IN | BODY MASS INDEX: 25.76 KG/M2

## 2024-12-06 DIAGNOSIS — E78.2 MIXED HYPERLIPIDEMIA: ICD-10-CM

## 2024-12-06 DIAGNOSIS — J06.9 UPPER RESPIRATORY TRACT INFECTION, UNSPECIFIED TYPE: ICD-10-CM

## 2024-12-06 DIAGNOSIS — E11.9 TYPE 2 DIABETES MELLITUS WITHOUT COMPLICATION, WITHOUT LONG-TERM CURRENT USE OF INSULIN: Primary | ICD-10-CM

## 2024-12-06 DIAGNOSIS — F41.9 ANXIETY: ICD-10-CM

## 2024-12-06 RX ORDER — BUSPIRONE HYDROCHLORIDE 5 MG/1
5 TABLET ORAL 2 TIMES DAILY PRN
Qty: 60 TABLET | Refills: 5 | Status: SHIPPED | OUTPATIENT
Start: 2024-12-06

## 2024-12-06 RX ORDER — SIMVASTATIN 10 MG
10 TABLET ORAL DAILY
Qty: 90 TABLET | Refills: 1 | Status: SHIPPED | OUTPATIENT
Start: 2024-12-06

## 2024-12-06 RX ORDER — METFORMIN HYDROCHLORIDE 500 MG/1
500 TABLET, EXTENDED RELEASE ORAL 2 TIMES DAILY
Qty: 180 TABLET | Refills: 1 | Status: SHIPPED | OUTPATIENT
Start: 2024-12-06

## 2024-12-06 RX ORDER — HYDROXYZINE HYDROCHLORIDE 25 MG/1
25 TABLET, FILM COATED ORAL 3 TIMES DAILY PRN
Qty: 90 TABLET | Refills: 1 | Status: SHIPPED | OUTPATIENT
Start: 2024-12-06

## 2024-12-06 NOTE — ASSESSMENT & PLAN NOTE
Further treatment recommendations pending lab results.  Follow-up in 6 months or sooner if concerns.

## 2024-12-06 NOTE — PROGRESS NOTES
Chief Complaint  Diabetes (6 month follow up ), Hyperlipidemia, Anxiety, and Nasal Congestion (Patient c/o head cold and sick after having covid shot on Wednesday )    Subjective          Radha GUTIERREZ Messi presents to [facility]     Patient is a 59-year-old female is here today to follow-up regarding type 2 diabetes.  Last hemoglobin A1c was 6.9% in May.  Current treatment is metformin extended release 500 mg twice per day.  Denies side effects and requests refills.  Last eye exam last year at [facility].    For hyperlipidemia she takes simvastatin 10 mg at bedtime.  Denies side effects and requests refills.    Patient takes hydroxyzine 25 mg tablet as needed at bedtime as needed insomnia.  Buspirone 5 mg twice per day as needed is beneficial for anxiety.  Denies side effects and requests refills.    Received COVID-vaccine 2 days ago at her pharmacy.  Started feeling chills several hours later.  She had had a cough and cold symptoms in the last couple of months that completely resolved but resumed again 2 days ago.  She has been exposed to similar illness in her mother and grandchildren.  They have all tested negative for COVID and patient denies body aches or flulike symptoms.  Defers COVID or flu testing.  Currently using Vicks nasal inhaler and she is aware not to use this for more than 5 days.  She takes Zyrtec and Sudafed.  She denies fever or any other associated symptoms, shortness of breath, or cough.    Patient states she has had intermittent acid reflux and previous upper scopes in the past that were not concerning.  She currently is taking Pepcid AC in the evening as needed.  She purchases over-the-counter.  We discussed histamine blockers versus proton pump inhibitors as management of acid reflux symptoms along with trigger reduction.  She has been treated for H. pylori once in the past but not recently.  Upper scopes have never revealed H. pylori infection to her  "knowledge.  Defers H. pylori breath testing at this time.  Patient states she will consider for future evaluation.  Patient feels as if symptoms are stable with Pepcid AC and trigger reduction at this time.  She denies abdominal pain, nausea, or vomiting.    Patient is a candidate for possible vaccination with Prevnar 20 or shingles vaccination.  Handouts are provided on both vaccinations for patient information.     Objective   Vital Signs:   Vitals:    12/06/24 0849   BP: 113/70   BP Location: Left arm   Patient Position: Sitting   Cuff Size: Adult   Pulse: 82   Temp: 97.4 °F (36.3 °C)   TempSrc: Temporal   SpO2: 97%   Weight: 77.1 kg (170 lb)   Height: 172.7 cm (68\")       Wt Readings from Last 3 Encounters:   12/06/24 77.1 kg (170 lb)   05/24/24 78.5 kg (173 lb)   10/27/23 76.5 kg (168 lb 9.6 oz)      BP Readings from Last 3 Encounters:   12/06/24 113/70   05/24/24 112/77   10/27/23 110/76       Body mass index is 25.85 kg/m².             Physical Exam  Vitals reviewed.   Constitutional:       General: She is not in acute distress.     Appearance: Normal appearance. She is well-developed.   HENT:      Right Ear: A middle ear effusion is present.      Left Ear: A middle ear effusion is present.      Mouth/Throat:      Pharynx: No oropharyngeal exudate or posterior oropharyngeal erythema.   Neck:      Thyroid: No thyromegaly.      Vascular: No carotid bruit.   Cardiovascular:      Rate and Rhythm: Normal rate and regular rhythm.      Pulses:           Posterior tibial pulses are 2+ on the right side and 2+ on the left side.      Heart sounds: Normal heart sounds.   Pulmonary:      Effort: Pulmonary effort is normal.      Breath sounds: Normal breath sounds.   Musculoskeletal:      Right lower leg: No edema.      Left lower leg: No edema.   Skin:     General: Skin is warm and dry.   Neurological:      General: No focal deficit present.      Mental Status: She is alert.   Psychiatric:         Attention and " Perception: Attention normal.         Mood and Affect: Mood and affect normal.         Behavior: Behavior normal.           Current Outpatient Medications:     busPIRone (BUSPAR) 5 MG tablet, Take 1 tablet by mouth 2 (Two) Times a Day As Needed (anxiety)., Disp: 60 tablet, Rfl: 5    CALCIUM PO, Take  by mouth., Disp: , Rfl:     cetirizine (zyrTEC) 5 MG tablet, Take 1 tablet by mouth Daily., Disp: , Rfl:     Fluticasone-Salmeterol (Advair Diskus) 250-50 MCG/ACT DISKUS, INHALE 1 PUFF TWICE DAILY, Disp: 60 each, Rfl: 2    glucose blood (ReliOn True Metrix Test Strips) test strip, Check glucose once daily, Disp: 100 each, Rfl: 1    hydrOXYzine (ATARAX) 25 MG tablet, Take 1 tablet by mouth 3 (Three) Times a Day As Needed for Itching, Allergies or Anxiety., Disp: 90 tablet, Rfl: 1    metFORMIN ER (GLUCOPHAGE-XR) 500 MG 24 hr tablet, Take 1 tablet by mouth 2 (Two) Times a Day., Disp: 180 tablet, Rfl: 1    simvastatin (ZOCOR) 10 MG tablet, Take 1 tablet by mouth Daily., Disp: 90 tablet, Rfl: 1    VITAMIN D PO, Take  by mouth., Disp: , Rfl:    Past Medical History:   Diagnosis Date    Atopic dermatitis, unspecified     Crohn disease     DX'D AT AGE 20s    GERD with esophagitis     Mixed hyperlipidemia     Overweight     Prediabetes     Rash and other nonspecific skin eruption     Unspecified asthma, uncomplicated     Vitamin D deficiency, unspecified      Allergies   Allergen Reactions    Flonase [Fluticasone] Headache    Codeine Nausea Only               Result Review :     Common labs          5/29/2024    07:47   Common Labs   Glucose 115    BUN 16    Creatinine 1.08    Sodium 141    Potassium 4.4    Chloride 102    Calcium 10.1    Total Protein 6.9    Albumin 4.2    Total Bilirubin 0.3    Alkaline Phosphatase 81    AST (SGOT) 14    ALT (SGPT) 14    WBC 5.8    Hemoglobin 12.5    Hematocrit 38.5    Platelets 293    Total Cholesterol 169    Triglycerides 159    HDL Cholesterol 45    LDL Cholesterol  96    Hemoglobin A1C 6.9          No Images in the past 120 days found..           Social History     Tobacco Use   Smoking Status Never   Smokeless Tobacco Never           Assessment and Plan    Diagnoses and all orders for this visit:    1. Type 2 diabetes mellitus without complication, without long-term current use of insulin (Primary)  Assessment & Plan:  Further treatment recommendations pending lab results.  Follow-up in 6 months or sooner if concerns.    Orders:  -     Comprehensive metabolic panel; Future  -     Hemoglobin A1c; Future  -     Lipid panel; Future  -     Microalbumin / Creatinine Urine Ratio - Urine, Clean Catch; Future  -     metFORMIN ER (GLUCOPHAGE-XR) 500 MG 24 hr tablet; Take 1 tablet by mouth 2 (Two) Times a Day.  Dispense: 180 tablet; Refill: 1    2. Mixed hyperlipidemia  -     simvastatin (ZOCOR) 10 MG tablet; Take 1 tablet by mouth Daily.  Dispense: 90 tablet; Refill: 1    3. Anxiety  Assessment & Plan:  Stable on current medication.    Orders:  -     hydrOXYzine (ATARAX) 25 MG tablet; Take 1 tablet by mouth 3 (Three) Times a Day As Needed for Itching, Allergies or Anxiety.  Dispense: 90 tablet; Refill: 1  -     busPIRone (BUSPAR) 5 MG tablet; Take 1 tablet by mouth 2 (Two) Times a Day As Needed (anxiety).  Dispense: 60 tablet; Refill: 5    4. Upper respiratory tract infection, unspecified type  Assessment & Plan:  Continue symptomatic treatment.  Follow-up for any persisting or worsening symptoms.          Follow Up    No follow-ups on file.  Patient was given instructions and counseling regarding her condition or for health maintenance advice. Please see specific information pulled into the AVS if appropriate.

## 2024-12-07 LAB
ALBUMIN SERPL-MCNC: 4.5 G/DL (ref 3.8–4.9)
ALBUMIN/CREAT UR: 7 MG/G CREAT (ref 0–29)
ALP SERPL-CCNC: 89 IU/L (ref 44–121)
ALT SERPL-CCNC: 18 IU/L (ref 0–32)
AMBIG ABBREV CMP14 DEFAULT: NORMAL
AMBIG ABBREV LP DEFAULT: NORMAL
AST SERPL-CCNC: 18 IU/L (ref 0–40)
BILIRUB SERPL-MCNC: 0.4 MG/DL (ref 0–1.2)
BUN SERPL-MCNC: 15 MG/DL (ref 8–27)
BUN/CREAT SERPL: 16 (ref 12–28)
CALCIUM SERPL-MCNC: 9.6 MG/DL (ref 8.7–10.3)
CHLORIDE SERPL-SCNC: 103 MMOL/L (ref 96–106)
CHOLEST SERPL-MCNC: 164 MG/DL (ref 100–199)
CO2 SERPL-SCNC: 26 MMOL/L (ref 20–29)
CREAT SERPL-MCNC: 0.94 MG/DL (ref 0.57–1)
CREAT UR-MCNC: 236 MG/DL
EGFRCR SERPLBLD CKD-EPI 2021: 69 ML/MIN/1.73
GLOBULIN SER CALC-MCNC: 2.7 G/DL (ref 1.5–4.5)
GLUCOSE SERPL-MCNC: 119 MG/DL (ref 70–99)
HBA1C MFR BLD: 7.1 % (ref 4.8–5.6)
HDLC SERPL-MCNC: 46 MG/DL
LDLC SERPL CALC-MCNC: 93 MG/DL (ref 0–99)
MICROALBUMIN UR-MCNC: 15.4 UG/ML
POTASSIUM SERPL-SCNC: 4.2 MMOL/L (ref 3.5–5.2)
PROT SERPL-MCNC: 7.2 G/DL (ref 6–8.5)
SODIUM SERPL-SCNC: 142 MMOL/L (ref 134–144)
TRIGL SERPL-MCNC: 139 MG/DL (ref 0–149)
VLDLC SERPL CALC-MCNC: 25 MG/DL (ref 5–40)

## 2024-12-11 DIAGNOSIS — E11.9 TYPE 2 DIABETES MELLITUS WITHOUT COMPLICATION, WITHOUT LONG-TERM CURRENT USE OF INSULIN: Primary | ICD-10-CM

## 2024-12-11 NOTE — PROGRESS NOTES
Lipid panel looks great.  Kidney and liver function are normal.  Hemoglobin A1c is up very slightly but overall stable for the last 3 years.  No changes recommended at this time.  Please follow-up for any concerns prior to your next appointment.  I have entered lab orders that you may have done 3 to 7 days prior to next appointment.

## 2024-12-27 ENCOUNTER — TELEPHONE (OUTPATIENT)
Dept: FAMILY MEDICINE CLINIC | Age: 59
End: 2024-12-27
Payer: COMMERCIAL

## 2025-06-04 ENCOUNTER — OFFICE VISIT (OUTPATIENT)
Dept: FAMILY MEDICINE CLINIC | Age: 60
End: 2025-06-04
Payer: COMMERCIAL

## 2025-06-04 VITALS
BODY MASS INDEX: 26.4 KG/M2 | DIASTOLIC BLOOD PRESSURE: 86 MMHG | WEIGHT: 174.2 LBS | OXYGEN SATURATION: 95 % | SYSTOLIC BLOOD PRESSURE: 118 MMHG | HEIGHT: 68 IN | TEMPERATURE: 97.4 F | HEART RATE: 81 BPM

## 2025-06-04 DIAGNOSIS — E11.9 TYPE 2 DIABETES MELLITUS WITHOUT COMPLICATION, WITHOUT LONG-TERM CURRENT USE OF INSULIN: ICD-10-CM

## 2025-06-04 DIAGNOSIS — F41.9 ANXIETY: ICD-10-CM

## 2025-06-04 DIAGNOSIS — E78.2 MIXED HYPERLIPIDEMIA: ICD-10-CM

## 2025-06-04 RX ORDER — BUSPIRONE HYDROCHLORIDE 5 MG/1
5 TABLET ORAL 2 TIMES DAILY PRN
Qty: 60 TABLET | Refills: 5 | Status: SHIPPED | OUTPATIENT
Start: 2025-06-04

## 2025-06-04 RX ORDER — SIMVASTATIN 10 MG
10 TABLET ORAL DAILY
Qty: 90 TABLET | Refills: 1 | Status: SHIPPED | OUTPATIENT
Start: 2025-06-04

## 2025-06-04 RX ORDER — METFORMIN HYDROCHLORIDE 500 MG/1
500 TABLET, EXTENDED RELEASE ORAL 2 TIMES DAILY
Qty: 180 TABLET | Refills: 1 | Status: SHIPPED | OUTPATIENT
Start: 2025-06-04

## 2025-06-04 RX ORDER — HYDROXYZINE HYDROCHLORIDE 25 MG/1
25 TABLET, FILM COATED ORAL 3 TIMES DAILY PRN
Qty: 90 TABLET | Refills: 1 | Status: SHIPPED | OUTPATIENT
Start: 2025-06-04

## 2025-06-04 NOTE — PROGRESS NOTES
"Chief Complaint  Hyperlipidemia, Diabetes, and Anxiety    Subjective          Radha Swenson presents to [facility]     Patient is 59-year-old female is here today to follow-up regarding type 2 diabetes.  Current treatment is metformin extended release 500 mg twice per day.  Denies side effects and requests refills.  Has a glucometer at home but has not felt the need to check blood sugar.  Hemoglobin A1c was 7.1 in December.  She needs [facility].  She is fasting.    Defers shingles vaccine.  Regularly follows gynecology who orders mammograms.  She is menopausal.  For hyperlipidemia she takes simvastatin 10 mg at bedtime.  Denies side effects and requests refills.    Anxiety insomnia she takes hydroxyzine 25 mg at bedtime as needed.  Buspirone 5 mg twice daily as needed.  Rarely needs to take buspirone.  Denies side effects and requests refills.     Objective   Vital Signs:   Vitals:    06/04/25 0803   BP: 118/86   BP Location: Left arm   Patient Position: Sitting   Pulse: 81   Temp: 97.4 °F (36.3 °C)   TempSrc: Temporal   SpO2: 95%   Weight: 79 kg (174 lb 3.2 oz)   Height: 172.7 cm (68\")       Wt Readings from Last 3 Encounters:   06/04/25 79 kg (174 lb 3.2 oz)   12/06/24 77.1 kg (170 lb)   05/24/24 78.5 kg (173 lb)      BP Readings from Last 3 Encounters:   06/04/25 118/86   12/06/24 113/70   05/24/24 112/77       Body mass index is 26.49 kg/m².           Physical Exam  Vitals reviewed.   Constitutional:       General: She is not in acute distress.     Appearance: Normal appearance. She is well-developed.   Neck:      Thyroid: No thyromegaly.      Vascular: No carotid bruit.   Cardiovascular:      Rate and Rhythm: Normal rate and regular rhythm.      Pulses:           Posterior tibial pulses are 2+ on the right side and 2+ on the left side.      Heart sounds: Normal heart sounds.   Pulmonary:      Effort: Pulmonary effort is " normal.      Breath sounds: Normal breath sounds.   Musculoskeletal:      Right lower leg: No edema.      Left lower leg: No edema.   Skin:     General: Skin is warm and dry.   Neurological:      General: No focal deficit present.      Mental Status: She is alert.   Psychiatric:         Attention and Perception: Attention normal.         Mood and Affect: Mood and affect normal.         Behavior: Behavior normal.           Current Outpatient Medications:     busPIRone (BUSPAR) 5 MG tablet, Take 1 tablet by mouth 2 (Two) Times a Day As Needed (anxiety)., Disp: 60 tablet, Rfl: 5    CALCIUM PO, Take  by mouth., Disp: , Rfl:     cetirizine (zyrTEC) 5 MG tablet, Take 1 tablet by mouth Daily., Disp: , Rfl:     Fluticasone-Salmeterol (Advair Diskus) 250-50 MCG/ACT DISKUS, INHALE 1 PUFF TWICE DAILY, Disp: 60 each, Rfl: 2    glucose blood (ReliOn True Metrix Test Strips) test strip, Check glucose once daily, Disp: 100 each, Rfl: 1    hydrOXYzine (ATARAX) 25 MG tablet, Take 1 tablet by mouth 3 (Three) Times a Day As Needed for Itching, Allergies or Anxiety., Disp: 90 tablet, Rfl: 1    metFORMIN ER (GLUCOPHAGE-XR) 500 MG 24 hr tablet, Take 1 tablet by mouth 2 (Two) Times a Day., Disp: 180 tablet, Rfl: 1    simvastatin (ZOCOR) 10 MG tablet, Take 1 tablet by mouth Daily., Disp: 90 tablet, Rfl: 1    VITAMIN D PO, Take  by mouth., Disp: , Rfl:    Past Medical History:   Diagnosis Date    Atopic dermatitis, unspecified     Crohn disease     DX'D AT AGE 20s    GERD with esophagitis     Mixed hyperlipidemia     Overweight     Prediabetes     Rash and other nonspecific skin eruption     Unspecified asthma, uncomplicated     Vitamin D deficiency, unspecified      Allergies   Allergen Reactions    Flonase [Fluticasone] Headache    Codeine Nausea Only               Result Review :     Common labs          12/6/2024    09:48   Common Labs   Glucose 119    BUN 15    Creatinine 0.94    Sodium 142    Potassium 4.2    Chloride 103    Calcium  9.6    Albumin 4.5    Total Bilirubin 0.4    Alkaline Phosphatase 89    AST (SGOT) 18    ALT (SGPT) 18    Total Cholesterol 164    Triglycerides 139    HDL Cholesterol 46    LDL Cholesterol  93    Hemoglobin A1C 7.1    Microalbumin, Urine 15.4         No Images in the past 120 days found..           Social History     Tobacco Use   Smoking Status Never   Smokeless Tobacco Never           Assessment and Plan    Diagnoses and all orders for this visit:    1. Type 2 diabetes mellitus without complication, without long-term current use of insulin  -     metFORMIN ER (GLUCOPHAGE-XR) 500 MG 24 hr tablet; Take 1 tablet by mouth 2 (Two) Times a Day.  Dispense: 180 tablet; Refill: 1    2. Anxiety  -     busPIRone (BUSPAR) 5 MG tablet; Take 1 tablet by mouth 2 (Two) Times a Day As Needed (anxiety).  Dispense: 60 tablet; Refill: 5  -     hydrOXYzine (ATARAX) 25 MG tablet; Take 1 tablet by mouth 3 (Three) Times a Day As Needed for Itching, Allergies or Anxiety.  Dispense: 90 tablet; Refill: 1    3. Mixed hyperlipidemia  -     simvastatin (ZOCOR) 10 MG tablet; Take 1 tablet by mouth Daily.  Dispense: 90 tablet; Refill: 1        Follow Up    Return in about 6 months (around 12/4/2025).  Patient was given instructions and counseling regarding her condition or for health maintenance advice. Please see specific information pulled into the AVS if appropriate.

## 2025-06-05 LAB
ALBUMIN SERPL-MCNC: 4.6 G/DL (ref 3.8–4.9)
ALP SERPL-CCNC: 85 IU/L (ref 44–121)
ALT SERPL-CCNC: 13 IU/L (ref 0–32)
AST SERPL-CCNC: 15 IU/L (ref 0–40)
BILIRUB SERPL-MCNC: 0.4 MG/DL (ref 0–1.2)
BUN SERPL-MCNC: 19 MG/DL (ref 8–27)
BUN/CREAT SERPL: 20 (ref 12–28)
CALCIUM SERPL-MCNC: 10 MG/DL (ref 8.7–10.3)
CHLORIDE SERPL-SCNC: 100 MMOL/L (ref 96–106)
CHOLEST SERPL-MCNC: 155 MG/DL (ref 100–199)
CO2 SERPL-SCNC: 25 MMOL/L (ref 20–29)
CREAT SERPL-MCNC: 0.97 MG/DL (ref 0.57–1)
EGFRCR SERPLBLD CKD-EPI 2021: 67 ML/MIN/1.73
GLOBULIN SER CALC-MCNC: 2.7 G/DL (ref 1.5–4.5)
GLUCOSE SERPL-MCNC: 116 MG/DL (ref 70–99)
HBA1C MFR BLD: 6.5 % (ref 4.8–5.6)
HDLC SERPL-MCNC: 45 MG/DL
LDLC SERPL CALC-MCNC: 82 MG/DL (ref 0–99)
POTASSIUM SERPL-SCNC: 4.6 MMOL/L (ref 3.5–5.2)
PROT SERPL-MCNC: 7.3 G/DL (ref 6–8.5)
SODIUM SERPL-SCNC: 139 MMOL/L (ref 134–144)
TRIGL SERPL-MCNC: 160 MG/DL (ref 0–149)
VLDLC SERPL CALC-MCNC: 28 MG/DL (ref 5–40)

## 2025-07-24 RX ORDER — FLUTICASONE PROPIONATE AND SALMETEROL 250; 50 UG/1; UG/1
1 POWDER RESPIRATORY (INHALATION) 2 TIMES DAILY
Qty: 60 EACH | Refills: 5 | Status: SHIPPED | OUTPATIENT
Start: 2025-07-24